# Patient Record
Sex: FEMALE | Race: WHITE | NOT HISPANIC OR LATINO | Employment: OTHER | URBAN - METROPOLITAN AREA
[De-identification: names, ages, dates, MRNs, and addresses within clinical notes are randomized per-mention and may not be internally consistent; named-entity substitution may affect disease eponyms.]

---

## 2024-07-29 ENCOUNTER — LAB (OUTPATIENT)
Dept: LAB | Facility: HOSPITAL | Age: 69
End: 2024-07-29
Payer: COMMERCIAL

## 2024-07-29 ENCOUNTER — HOSPITAL ENCOUNTER (OUTPATIENT)
Dept: GENERAL RADIOLOGY | Facility: HOSPITAL | Age: 69
Discharge: HOME | End: 2024-07-29
Payer: COMMERCIAL

## 2024-07-29 ENCOUNTER — OFFICE VISIT (OUTPATIENT)
Dept: INTERNAL MEDICINE | Facility: CLINIC | Age: 69
End: 2024-07-29
Payer: COMMERCIAL

## 2024-07-29 VITALS
WEIGHT: 240.8 LBS | DIASTOLIC BLOOD PRESSURE: 90 MMHG | BODY MASS INDEX: 42.66 KG/M2 | TEMPERATURE: 98 F | SYSTOLIC BLOOD PRESSURE: 130 MMHG | HEIGHT: 63 IN | HEART RATE: 70 BPM | OXYGEN SATURATION: 97 %

## 2024-07-29 DIAGNOSIS — Z13.29 SCREENING FOR ENDOCRINE DISORDER: ICD-10-CM

## 2024-07-29 DIAGNOSIS — E11.65 TYPE 2 DIABETES MELLITUS WITH HYPERGLYCEMIA, WITH LONG-TERM CURRENT USE OF INSULIN: ICD-10-CM

## 2024-07-29 DIAGNOSIS — Z13.21 ENCOUNTER FOR VITAMIN DEFICIENCY SCREENING: ICD-10-CM

## 2024-07-29 DIAGNOSIS — Z13.228 SCREENING FOR METABOLIC DISORDER: ICD-10-CM

## 2024-07-29 DIAGNOSIS — E11.65 TYPE 2 DIABETES MELLITUS WITH HYPERGLYCEMIA, WITH LONG-TERM CURRENT USE OF INSULIN: Primary | ICD-10-CM

## 2024-07-29 DIAGNOSIS — Z79.4 TYPE 2 DIABETES MELLITUS WITH HYPERGLYCEMIA, WITH LONG-TERM CURRENT USE OF INSULIN: Primary | ICD-10-CM

## 2024-07-29 DIAGNOSIS — Z13.0 SCREENING FOR DEFICIENCY ANEMIA: ICD-10-CM

## 2024-07-29 DIAGNOSIS — Z87.891 HISTORY OF TOBACCO USE: ICD-10-CM

## 2024-07-29 DIAGNOSIS — I10 ESSENTIAL HYPERTENSION: ICD-10-CM

## 2024-07-29 DIAGNOSIS — J45.20 MILD INTERMITTENT ASTHMA WITHOUT COMPLICATION: ICD-10-CM

## 2024-07-29 DIAGNOSIS — Z13.820 SCREENING FOR OSTEOPOROSIS: ICD-10-CM

## 2024-07-29 DIAGNOSIS — M65.331 TRIGGER MIDDLE FINGER OF RIGHT HAND: ICD-10-CM

## 2024-07-29 DIAGNOSIS — E78.2 MIXED HYPERLIPIDEMIA: ICD-10-CM

## 2024-07-29 DIAGNOSIS — I50.22 CHRONIC SYSTOLIC (CONGESTIVE) HEART FAILURE: ICD-10-CM

## 2024-07-29 DIAGNOSIS — Z79.4 TYPE 2 DIABETES MELLITUS WITH HYPERGLYCEMIA, WITH LONG-TERM CURRENT USE OF INSULIN: ICD-10-CM

## 2024-07-29 PROBLEM — J30.9 ALLERGIC RHINITIS: Status: RESOLVED | Noted: 2024-07-29 | Resolved: 2024-07-29

## 2024-07-29 PROBLEM — L08.9 INFECTION OF SKIN: Status: RESOLVED | Noted: 2024-07-29 | Resolved: 2024-07-29

## 2024-07-29 PROBLEM — B37.9 CANDIDA GLABRATA INFECTION: Status: RESOLVED | Noted: 2024-07-29 | Resolved: 2024-07-29

## 2024-07-29 PROBLEM — F95.8 BEHAVIORAL TIC: Status: ACTIVE | Noted: 2024-07-29

## 2024-07-29 PROBLEM — F95.8 BEHAVIORAL TIC: Status: RESOLVED | Noted: 2024-07-29 | Resolved: 2024-07-29

## 2024-07-29 PROBLEM — J45.909 AIRWAY HYPERREACTIVITY: Status: RESOLVED | Noted: 2024-07-29 | Resolved: 2024-07-29

## 2024-07-29 PROBLEM — I50.21 ACUTE SYSTOLIC HEART FAILURE: Status: ACTIVE | Noted: 2024-07-29

## 2024-07-29 PROBLEM — J45.909 AIRWAY HYPERREACTIVITY: Status: ACTIVE | Noted: 2024-07-29

## 2024-07-29 LAB
25(OH)D3 SERPL-MCNC: 25 NG/ML (ref 30–100)
ALBUMIN SERPL-MCNC: 4.4 G/DL (ref 3.5–5.2)
ALBUMIN/GLOB SERPL: 1.7 G/DL
ALP SERPL-CCNC: 58 U/L (ref 39–117)
ALT SERPL W P-5'-P-CCNC: 14 U/L (ref 1–33)
ANION GAP SERPL CALCULATED.3IONS-SCNC: 10.6 MMOL/L (ref 5–15)
AST SERPL-CCNC: 19 U/L (ref 1–32)
BASOPHILS # BLD AUTO: 0.04 10*3/MM3 (ref 0–0.2)
BASOPHILS NFR BLD AUTO: 0.7 % (ref 0–1.5)
BILIRUB SERPL-MCNC: 0.4 MG/DL (ref 0–1.2)
BUN SERPL-MCNC: 12 MG/DL (ref 8–23)
BUN/CREAT SERPL: 16.7 (ref 7–25)
CALCIUM SPEC-SCNC: 9.5 MG/DL (ref 8.6–10.5)
CHLORIDE SERPL-SCNC: 106 MMOL/L (ref 98–107)
CHOLEST SERPL-MCNC: 148 MG/DL (ref 0–200)
CO2 SERPL-SCNC: 24.4 MMOL/L (ref 22–29)
CREAT SERPL-MCNC: 0.72 MG/DL (ref 0.57–1)
DEPRECATED RDW RBC AUTO: 40.7 FL (ref 37–54)
EGFRCR SERPLBLD CKD-EPI 2021: 92.3 ML/MIN/1.73
EOSINOPHIL # BLD AUTO: 0.24 10*3/MM3 (ref 0–0.4)
EOSINOPHIL NFR BLD AUTO: 4.2 % (ref 0.3–6.2)
ERYTHROCYTE [DISTWIDTH] IN BLOOD BY AUTOMATED COUNT: 12.7 % (ref 12.3–15.4)
GLOBULIN UR ELPH-MCNC: 2.6 GM/DL
GLUCOSE SERPL-MCNC: 103 MG/DL (ref 65–99)
HBA1C MFR BLD: 6.4 % (ref 4.8–5.6)
HCT VFR BLD AUTO: 38.9 % (ref 34–46.6)
HDLC SERPL-MCNC: 50 MG/DL (ref 40–60)
HGB BLD-MCNC: 12.6 G/DL (ref 12–15.9)
IMM GRANULOCYTES # BLD AUTO: 0.02 10*3/MM3 (ref 0–0.05)
IMM GRANULOCYTES NFR BLD AUTO: 0.4 % (ref 0–0.5)
LDLC SERPL CALC-MCNC: 82 MG/DL (ref 0–100)
LDLC/HDLC SERPL: 1.64 {RATIO}
LYMPHOCYTES # BLD AUTO: 1.58 10*3/MM3 (ref 0.7–3.1)
LYMPHOCYTES NFR BLD AUTO: 27.9 % (ref 19.6–45.3)
MCH RBC QN AUTO: 28.4 PG (ref 26.6–33)
MCHC RBC AUTO-ENTMCNC: 32.4 G/DL (ref 31.5–35.7)
MCV RBC AUTO: 87.8 FL (ref 79–97)
MONOCYTES # BLD AUTO: 0.37 10*3/MM3 (ref 0.1–0.9)
MONOCYTES NFR BLD AUTO: 6.5 % (ref 5–12)
NEUTROPHILS NFR BLD AUTO: 3.41 10*3/MM3 (ref 1.7–7)
NEUTROPHILS NFR BLD AUTO: 60.3 % (ref 42.7–76)
NRBC BLD AUTO-RTO: 0 /100 WBC (ref 0–0.2)
PLATELET # BLD AUTO: 167 10*3/MM3 (ref 140–450)
PMV BLD AUTO: 12.2 FL (ref 6–12)
POTASSIUM SERPL-SCNC: 3.9 MMOL/L (ref 3.5–5.2)
PROT SERPL-MCNC: 7 G/DL (ref 6–8.5)
RBC # BLD AUTO: 4.43 10*6/MM3 (ref 3.77–5.28)
SODIUM SERPL-SCNC: 141 MMOL/L (ref 136–145)
TRIGL SERPL-MCNC: 81 MG/DL (ref 0–150)
TSH SERPL DL<=0.05 MIU/L-ACNC: 2.46 UIU/ML (ref 0.27–4.2)
VLDLC SERPL-MCNC: 16 MG/DL (ref 5–40)
WBC NRBC COR # BLD AUTO: 5.66 10*3/MM3 (ref 3.4–10.8)

## 2024-07-29 PROCEDURE — 73130 X-RAY EXAM OF HAND: CPT

## 2024-07-29 PROCEDURE — 82306 VITAMIN D 25 HYDROXY: CPT

## 2024-07-29 PROCEDURE — 83036 HEMOGLOBIN GLYCOSYLATED A1C: CPT

## 2024-07-29 PROCEDURE — 36415 COLL VENOUS BLD VENIPUNCTURE: CPT

## 2024-07-29 PROCEDURE — 82043 UR ALBUMIN QUANTITATIVE: CPT

## 2024-07-29 PROCEDURE — 99204 OFFICE O/P NEW MOD 45 MIN: CPT | Performed by: NURSE PRACTITIONER

## 2024-07-29 PROCEDURE — 80050 GENERAL HEALTH PANEL: CPT

## 2024-07-29 PROCEDURE — 80061 LIPID PANEL: CPT

## 2024-07-29 PROCEDURE — 82570 ASSAY OF URINE CREATININE: CPT

## 2024-07-29 RX ORDER — PRAVASTATIN SODIUM 20 MG
20 TABLET ORAL DAILY
COMMUNITY

## 2024-07-29 RX ORDER — TORSEMIDE 20 MG/1
20 TABLET ORAL DAILY
COMMUNITY

## 2024-07-29 RX ORDER — CARVEDILOL 12.5 MG/1
12.5 TABLET ORAL 2 TIMES DAILY WITH MEALS
COMMUNITY

## 2024-07-29 RX ORDER — LISINOPRIL 10 MG/1
10 TABLET ORAL DAILY
COMMUNITY

## 2024-07-29 RX ORDER — SEMAGLUTIDE 0.68 MG/ML
INJECTION, SOLUTION SUBCUTANEOUS WEEKLY
COMMUNITY
End: 2024-07-29 | Stop reason: SDUPTHER

## 2024-07-29 RX ORDER — SEMAGLUTIDE 0.68 MG/ML
0.5 INJECTION, SOLUTION SUBCUTANEOUS WEEKLY
Qty: 3 ML | Refills: 2 | Status: SHIPPED | OUTPATIENT
Start: 2024-07-29

## 2024-07-29 RX ORDER — ALBUTEROL SULFATE 90 MCG
2 HFA AEROSOL WITH ADAPTER (GRAM) INHALATION EVERY 4 HOURS PRN
COMMUNITY
Start: 2013-07-26

## 2024-07-29 NOTE — ASSESSMENT & PLAN NOTE
Hypertension is stable and controlled  Continue current treatment regimen.  Weight loss.  Regular aerobic exercise.  Blood pressure will be reassessed in 3 months.    Continue Lisinopril 10 mg daily

## 2024-07-29 NOTE — ASSESSMENT & PLAN NOTE
- Heart failure appears to be under control at this time. Please ensure to follow up with cardiologist for further evaluation of heart failure and ICD device.   Continue taking Torsemide 20 mg daily, carvedilol 12.5 mg BID. Monitors and stays within fluid restriction most days.

## 2024-07-29 NOTE — ASSESSMENT & PLAN NOTE
- We will repeat A1C today to determine if current treatment plan is effective.   - Ozempic refill has been sent to the pharmacy.   - Continue to exercise by swimming.   - Work on improving diet.   - Does Diabetic eye exam annually.

## 2024-07-29 NOTE — PROGRESS NOTES
New Patient Office Visit      Date: 2024   Patient Name: Vicky Ortiz  : 1955   MRN: [id number]     Chief Complaint:    Chief Complaint   Patient presents with    Med Refill           Subjective   History of Present Illness: Vicky Ortiz is a 68 y.o. female who is here today to establish care.  Mrs. Ortiz brought most recent progress notes from her prior PCP in [place] dated 2024.  I have reviewed those records.    Vicky moved back to [place], [place] from [place] 2024. She lives in an apartment with her  and his therapy dog. Her  has a degenerative neuro disorder and she acts as his primary caregiver. She does not use alcohol or illicit drugs, and she quit smoking 10 years ago. The apartment complex she moved into has a pool and she has been swimming for exercise. Vicky admits that her diet has not been as healthy as it should be. Since moving she has had difficulty finding a routine to allow for healthier eating.     Diabetes mellitus type 2:  She was doing well on medication regimen of metformin 1000 mg twice daily, Ozempic 0.5 mg weekly subcu.  She was tolerating well without any side effects.  Her A1c 2023 was 6.3.  Blood glucose level at home range from 1 20-1 40; she denies any hypoglycemic episodes.  She has previously tried Jardiance with the side effect of hives and Amaryl which was discontinued due to improvement in A1c.  She has been out of her Ozempic for 2 months and is requesting a refill today. She reports taking her metformin as prescribed. Does not check her blood glucose levels regularly.       Vicky had a pacemaker/defibrillator device placed in 2019. She needs a referral to cardiologist in [place]. In the past she saw Dr. Ruperto Calles and would like to see him again if possible.  While in [place] she was following with Dr. Rai, cardiology.  She denies any complaints regarding her device or heart failure. Takes Toresemide  20 mg daily and carvedilol 12.5 mg BID. She does have edema in bilat legs most days. Tries to watch her fluid intake.    Echocardiogram: Ejection fraction 45%, grade 1 LV diastolic function abnormal, trace AR/MR, mild TR, RVSP 25-30  August 2023 BNP within normal limits    Moderate persistent asthma: Patient takes Trelegy 1 puff daily and albuterol inhaler as needed.  S TEMI frequency use: Almost once a day which is an improvement from reduced twice a day; rarely uses DuoNeb nebulizer as needed  Feels symptoms are controlled on current regiment.  She feels seasonal allergy symptoms because asthma symptoms act up intermittently.    Thyroid nodule: Ultrasound thyroid completed on August 22, 2023: Bilateral thyroid nodularity none meet TI-RADS criteria for FNA nodules were of low suspicion    1 nodules: CT lung cancer screening September 2022: Lung-RADS 4A, recommended repeat in 3 months.  Ms. Yee was evaluated with pulmonology on September 14, 2022 which recommended repeat CT scan in 3 months.    CT of lung cancer screening September 2023: Stability of lung nodules, lung-RADS category 2-benign.    B/P is controlled with lisinopril, carvedidlol and toresemide.  Denies headaches, visual disturbances.     About 6 months she started to develop trigger finger in her right 3rd digit. This is happening more often. She would like a referral to hand surgery.     She is requesting a handicap placard form to take to the DVM as her current placard is from the [facility].    Takes cholesterol medication as prescribed.  Tries to stay away from fried foods.         Review Of Systems:Review of Systems   Constitutional: Negative.    Respiratory:  Negative for chest tightness and shortness of breath.    Cardiovascular:  Negative for chest pain and leg swelling.   Gastrointestinal: Negative.    Musculoskeletal:         Right 3rd digit trigger finger   Neurological: Negative.  Negative for weakness and headaches.   All other  systems reviewed and are negative.       Past Medical History:   Past Medical History:   Diagnosis Date    Allergic 1978    Arthritis 2015    Asthma 1979    Candida glabrata infection 07/29/2024    CHF (congestive heart failure) 2013    COPD (chronic obstructive pulmonary disease) 2014    Coronary artery disease 2013    Diabetes mellitus 2015    Diabetic retinopathy     Hyperlipidemia 1997    Hypertension 1997    Neuromuscular disorder 2004    Obesity 1978    Osteoarthritis 09/15/2016    Right knee Per EPIC      Tachycardia 09/15/2016    Per EPIC         Past Surgical History:   Past Surgical History:   Procedure Laterality Date    CARDIAC CATHETERIZATION  2018    CARDIAC SURGERY  2019    COLONOSCOPY  2013    FRACTURE SURGERY  2018    PILONIDAL CYSTECTOMY      TONSILLECTOMY  1961       Family History:   Family History   Problem Relation Age of Onset    Cancer Mother         Breast    Miscarriages / Stillbirths Mother     Hypertension Mother     Hyperlipidemia Father     Other Father         Dementia    Hypertension Father     Alcohol abuse Brother     Hypertension Brother     Angina Brother     Hyperlipidemia Brother     Hypertension Brother     Diabetes Maternal Grandmother     Stroke Paternal Grandmother     Early death Paternal Grandfather         early 50s    Asthma Maternal Aunt     COPD Maternal Aunt     Alcohol abuse Maternal Uncle     Alcohol abuse Maternal Uncle     Early death Paternal Uncle         early 50s       Social History:   Social History     Socioeconomic History    Marital status:    Tobacco Use    Smoking status: Former     Current packs/day: 0.00     Average packs/day: 0.5 packs/day for 37.2 years (18.6 ttl pk-yrs)     Types: Cigarettes     Start date: 7/15/1976     Quit date: 10/4/2013     Years since quitting: 10.8    Smokeless tobacco: Never   Vaping Use    Vaping status: Never Used   Substance and Sexual Activity    Alcohol use: Never    Drug use: Never    Sexual activity: Not  Currently     Partners: Male     Birth control/protection: None     Comment: Infertile       Health Maintenance:   Colorectal Screening:   10 years ago; is due but would like to wait since she is primary caregiver of disabled .   Bone Density/DEXA: ; will order today   Hep C ( [id number]):  done a few years ago  Mammogram: (women over 40): Oct 2023   Diabetic Eye exam: completed May 2024; starting to get cataracts.     Medications:     Current Outpatient Medications:     ASPIRIN 81 PO, Take 1 tablet by mouth Daily., Disp: , Rfl:     carvedilol (COREG) 12.5 MG tablet, Take 1 tablet by mouth 2 (Two) Times a Day With Meals., Disp: , Rfl:     Fluticasone-Umeclidin-Vilant (TRELEGY ELLIPTA) 200-62.5-25 MCG/ACT inhaler, Inhale 1 puff Daily., Disp: , Rfl:     lisinopril (PRINIVIL,ZESTRIL) 10 MG tablet, Take 1 tablet by mouth Daily., Disp: , Rfl:     Ozempic, 0.25 or 0.5 MG/DOSE, 2 MG/3ML solution pen-injector, Inject 0.5 mg under the skin into the appropriate area as directed 1 (One) Time Per Week., Disp: 3 mL, Rfl: 2    pravastatin (PRAVACHOL) 20 MG tablet, Take 1 tablet by mouth Daily., Disp: , Rfl:     Proventil  (90 Base) MCG/ACT inhaler, Inhale 2 puffs Every 4 (Four) Hours As Needed for Wheezing or Shortness of Air., Disp: , Rfl:     torsemide (DEMADEX) 20 MG tablet, Take 1 tablet by mouth Daily., Disp: , Rfl:     metFORMIN (GLUCOPHAGE) 1000 MG tablet, Take 1 tablet by mouth 2 (Two) Times a Day With Meals., Disp: 60 tablet, Rfl: 5    Allergies:   Allergies   Allergen Reactions    Atorvastatin Nausea And Vomiting and Unknown - Low Severity    Jardiance [Empagliflozin] Rash    Shellfish-Derived Products GI Bleeding and Rash       Objective         Physical Exam:  Vital Signs:   Vitals:    24 1304   BP: 130/90   BP Location: Left arm   Patient Position: Sitting   Cuff Size: Adult   Pulse: 70   Temp: 98 °F (36.7 °C)   TempSrc: Infrared   SpO2: 97%   Weight: 109 kg (240 lb 12.8 oz)   Height: 159.5  "cm (62.8\")   PainSc:   2   PainLoc: Hand  Comment: Right     Body mass index is 42.93 kg/m².  Class 3 Severe Obesity (BMI >=40). Obesity-related health conditions include the following: hypertension, coronary heart disease, diabetes mellitus, and dyslipidemias. Obesity is unchanged. BMI is is above average; BMI management plan is completed. We discussed low calorie, low carb based diet program, portion control, and increasing exercise.       Physical Exam  Vitals and nursing note reviewed.   Constitutional:       General: She is not in acute distress.     Appearance: Normal appearance. She is not ill-appearing.   HENT:      Head: Normocephalic.      Mouth/Throat:      Mouth: Mucous membranes are moist.   Eyes:      Conjunctiva/sclera: Conjunctivae normal.   Neck:      Thyroid: No thyromegaly.   Cardiovascular:      Rate and Rhythm: Normal rate and regular rhythm.      Pulses: Normal pulses.      Heart sounds: Normal heart sounds, S1 normal and S2 normal.   Pulmonary:      Effort: Pulmonary effort is normal.      Breath sounds: Normal breath sounds and air entry.   Abdominal:      General: Bowel sounds are normal.      Palpations: Abdomen is soft.      Tenderness: There is no abdominal tenderness.   Musculoskeletal:      Cervical back: Full passive range of motion without pain, normal range of motion and neck supple.      Right lower leg: No edema.      Left lower leg: No edema.   Lymphadenopathy:      Cervical: No cervical adenopathy.   Skin:     General: Skin is warm and dry.      Capillary Refill: Capillary refill takes less than 2 seconds.   Neurological:      General: No focal deficit present.      Mental Status: She is alert.      Coordination: Coordination is intact.      Gait: Gait is intact.           Results:   PHQ-9 Total Score: 0        Assessment / Plan      Assessment/Plan:   Diagnoses and all orders for this visit:    1. Type 2 diabetes mellitus with hyperglycemia, with long-term current use of insulin " (Primary)  Overview:  Metformin 1000 mg twice daily, Ozempic 0.5 mg weekly subcu, pravastatin 20 mg daily    Assessment & Plan:  - We will repeat A1C today to determine if current treatment plan is effective.   - Ozempic refill has been sent to the pharmacy.   - Continue to exercise by swimming.   - Work on improving diet.   - Does Diabetic eye exam annually.      Orders:  -     ORDER: Hemoglobin A1c; Future  -     Microalbumin / Creatinine Urine Ratio - Urine, Clean Catch; Future  -     metFORMIN (GLUCOPHAGE) 1000 MG tablet; Take 1 tablet by mouth 2 (Two) Times a Day With Meals.  Dispense: 60 tablet; Refill: 5    2. Screening for metabolic disorder  -     Comprehensive Metabolic Panel; Future    3. Screening for deficiency anemia  -     CBC & Differential; Future    4. Encounter for vitamin deficiency screening  -     Vitamin D,25-Hydroxy; Future    5. Screening for endocrine disorder  -     TSH; Future    6. Chronic systolic (congestive) heart failure  Assessment & Plan:  - Heart failure appears to be under control at this time. Please ensure to follow up with cardiologist for further evaluation of heart failure and ICD device.   Continue taking Torsemide 20 mg daily, carvedilol 12.5 mg BID. Monitors and stays within fluid restriction most days.     Orders:  -     Ambulatory Referral to Cardiology    7. Trigger middle finger of right hand  Assessment & Plan:  X-ray of right hand ordered;   Hand surgeon referral placed.     Orders:  -     XR Hand 3+ View Right; Future  -     Ambulatory Referral to Hand Surgery    8. Mixed hyperlipidemia  Assessment & Plan:  - Lipid panel   - Continue pravastatin as prescribed.     Orders:  -     Lipid Panel; Future    9. Essential hypertension  Assessment & Plan:  Hypertension is stable and controlled  Continue current treatment regimen.  Weight loss.  Regular aerobic exercise.  Blood pressure will be reassessed in 3 months.    Continue Lisinopril 10 mg daily       10. Mild  intermittent asthma without complication  Assessment & Plan:  Uses inhalers as directed.             11. Screening for osteoporosis  Assessment & Plan:  DEXA scan ordered.     Orders:  -     DEXA Bone Density Axial; Future    12. History of tobacco use  Assessment & Plan:  Quit smoking 10 years ago.  History of 37-year pack smoker  CT lung cancer screening September 2022: Lung RADS 4A, recommended to repeat in 3 months-  Patient was evaluated with pulmonology September 14, 2022 with a recommended repeat CT scan in 3 months.  September 2023: Stability of lung nodules, lung RADS category 2-benign.      Other orders  -     Ozempic, 0.25 or 0.5 MG/DOSE, 2 MG/3ML solution pen-injector; Inject 0.5 mg under the skin into the appropriate area as directed 1 (One) Time Per Week.  Dispense: 3 mL; Refill: 2         Follow Up:   Return in about 3 months (around 10/29/2024) for 3 month , Medicare Wellness.    MARK Storey   [facility] [address]

## 2024-07-30 PROBLEM — Z87.891 HISTORY OF TOBACCO USE: Status: ACTIVE | Noted: 2024-07-30

## 2024-07-30 PROBLEM — E04.1 THYROID NODULE: Status: ACTIVE | Noted: 2024-07-30

## 2024-07-30 PROBLEM — J45.40 MODERATE PERSISTENT ASTHMA: Status: RESOLVED | Noted: 2024-07-30 | Resolved: 2024-07-30

## 2024-07-30 PROBLEM — E04.1 THYROID NODULE: Status: RESOLVED | Noted: 2024-07-30 | Resolved: 2024-07-30

## 2024-07-30 PROBLEM — J45.40 MODERATE PERSISTENT ASTHMA: Status: ACTIVE | Noted: 2024-07-30

## 2024-07-30 LAB
ALBUMIN UR-MCNC: <1.2 MG/DL
CREAT UR-MCNC: 69.3 MG/DL
MICROALBUMIN/CREAT UR: NORMAL MG/G{CREAT}

## 2024-07-30 NOTE — ASSESSMENT & PLAN NOTE
Quit smoking 10 years ago.  History of 37-year pack smoker  CT lung cancer screening September 2022: Lung RADS 4A, recommended to repeat in 3 months-  Patient was evaluated with pulmonology September 14, 2022 with a recommended repeat CT scan in 3 months.  September 2023: Stability of lung nodules, lung RADS category 2-benign.

## 2024-08-02 NOTE — PROGRESS NOTES
"                                                                 [facility]     Office Visit       Date: 08/05/2024   Patient Name: Vicky Ortiz  MRN: [id number]  YOB: 1955    Referring Physician: Alexia Varela APRN     Chief Complaint:   Chief Complaint   Patient presents with    Right Hand - Pain     History of Present Illness:   Vicky Ortiz is a 68 y.o. female right-hand-dominant presented clinic with complaints of right long finger catching locking.  She reports symptoms been present for approximately 6 months.  This is worse in the morning and requires manual straightening.  She has had a previous right distal radius fracture performed in [place] in 2018.  She also states that she has had numbness and tingling to the right hand for several years.  No other complaints or concerns.    PMH: Hyperlipidemia, hypertension, type 2 diabetes.  Last hemoglobin A1c was 6.4.    Subjective   Review of Systems:   Review of Systems   Pertinent review of systems per HPI    I reviewed the patient's chief complaint, history of present illness, review of systems, past medical history, surgical history, family history, social history, medications and allergy list in the EMR on 08/05/2024 and agree with the findings above.    Objective    Vital Signs:   Vitals:    08/05/24 0807   BP: 124/86   Weight: 109 kg (240 lb 4.8 oz)   Height: 159.5 cm (62.8\")     BMI:      General: No acute distress. Alert and oriented.   Cardiovascular: Palpable radial pulse.   Respiratory: Breathing is nonlabored.     Ortho Exam:  Examination of the right upper extremity demonstrates a well-healed surgical incision overlying the volar aspect of the distal radius.  Slight atrophy is noted at the thenar eminence.  She is tender at the long finger A1 pulley.  Catching or locking of the long finger is elicited during examination.  Positive Tinel, Durkan's, Phalen's at the wrist.  5/5 APB and FDI strength.  Decreased " sensation throughout the median nerve distribution of the hand.  Warm and well-perfused distally.    Imaging / Studies:    Imaging Results (Last 24 Hours)       ** No results found for the last 24 hours. **        Right hand x-rays obtained on 7/29/2024 were personally reviewed and interpreted by myself.  The patient is status post distal radius volar plate and screw fixation.  Arthritic changes noted throughout the PIP and DIP joints.  No acute findings.     Procedure Note:  After reviewing the risks, benefits and alternatives to a steroid injection, which include but are not limited to; hypopigmentation, fat necrosis/atrophy, pain, swelling, bleeding, bruising, damage to nearby nerves/vessels, allergic reaction , transient elevation in blood glucose levels and infection a verbal consent was obtained. A time-out was then performed and the affected hand was prepped with chlorhexadine soap and ethyl chloride was used to numb the skin. The right long finger flexor tendon sheath was injected with 0.5cc: 0.5cc mixture of Kenalog - 40 mg/ml and Lidocaine - 1% / 2 ml. The injection was well tolerated and a sterile dressing was applied. There were no complications. I advised the patient that they might experience some local discomfort for the next couple days and can apply ice to the site as needed.    Assessment / Plan    Assessment/Plan:   Vicky Ortiz is a 68 y.o. female with right long trigger finger.    I discussed with the patient their clinical findings demonstrate a trigger finger.  We had a lengthy discussion regarding the pathophysiology of inflammation of the tendon-sheath unit, using the analogy of a subway tunnel.  Both conservative and surgical options were discussed.  Conservative treatments in the form of: observation, gentle stretching exercises, nighttime coban wrapping, and injection were presented. Discussed that approximately 70% of patients have complete symptoms resolution after 1 steroid  injection, and should they fail 1 injection, they may still be considered for a second steroid injection.  Also discussed that the patient may not see any improvement from the steroid injection for sometimes 2 weeks. Operative treatments in the form of A1 pulley release was also discussed.  After expressing understanding of all options, the patient elects to proceed with corticosteroid injection, nighttime Coban wrapping, and gentle stretching.    Additionally, I discussed with the patient their clinical findings demonstrate carpal tunnel syndrome.  The pathophysiology of the condition, including compression of the median nerve in the carpal tunnel, was explained in detail.  It was also discussed that with more severe symptomatology, such as persistent and worsening paresthesias, that permanent nerve damage may result, which may make improvement after surgery less predictable.  Both conservative and surgical options were discussed.  Conservative treatments in the form of: observation, gentle nerve gliding exercises, night time splinting, and injection were presented.  Operative treatment in the form of open carpal tunnel release was presented and reiterated that the goal of surgery is to prevent further compression and damage to the nerve.  Further workup with electrodiagnostic studies was also discussed. After expressing understanding of all options, the patient elects to proceed with nighttime bracing, gentle nerve glides, and obtaining electrodiagnostic studies.  I will see her back with EMG results.  They were agreeable with the plan.  All questions and concerns were addressed.        ICD-10-CM ICD-9-CM   1. Trigger middle finger of right hand  M65.331 727.03   2. Carpal tunnel syndrome of right wrist  G56.01 354.0     Follow Up:   Return for Follow Up- After Testing.      Vivian Kirkland MD  [facility]

## 2024-08-05 ENCOUNTER — OFFICE VISIT (OUTPATIENT)
Dept: ORTHOPEDIC SURGERY | Facility: CLINIC | Age: 69
End: 2024-08-05
Payer: COMMERCIAL

## 2024-08-05 VITALS
BODY MASS INDEX: 42.58 KG/M2 | WEIGHT: 240.3 LBS | SYSTOLIC BLOOD PRESSURE: 124 MMHG | HEIGHT: 63 IN | DIASTOLIC BLOOD PRESSURE: 86 MMHG

## 2024-08-05 DIAGNOSIS — G56.01 CARPAL TUNNEL SYNDROME OF RIGHT WRIST: ICD-10-CM

## 2024-08-05 DIAGNOSIS — M65.331 TRIGGER MIDDLE FINGER OF RIGHT HAND: Primary | ICD-10-CM

## 2024-08-05 PROCEDURE — 99204 OFFICE O/P NEW MOD 45 MIN: CPT | Performed by: STUDENT IN AN ORGANIZED HEALTH CARE EDUCATION/TRAINING PROGRAM

## 2024-08-05 PROCEDURE — 20550 NJX 1 TENDON SHEATH/LIGAMENT: CPT | Performed by: STUDENT IN AN ORGANIZED HEALTH CARE EDUCATION/TRAINING PROGRAM

## 2024-08-05 RX ORDER — TRIAMCINOLONE ACETONIDE 40 MG/ML
20 INJECTION, SUSPENSION INTRA-ARTICULAR; INTRAMUSCULAR
Status: COMPLETED | OUTPATIENT
Start: 2024-08-05 | End: 2024-08-05

## 2024-08-05 RX ORDER — LIDOCAINE HYDROCHLORIDE 10 MG/ML
0.5 INJECTION, SOLUTION INFILTRATION; PERINEURAL
Status: COMPLETED | OUTPATIENT
Start: 2024-08-05 | End: 2024-08-05

## 2024-08-05 RX ADMIN — TRIAMCINOLONE ACETONIDE 20 MG: 40 INJECTION, SUSPENSION INTRA-ARTICULAR; INTRAMUSCULAR at 08:24

## 2024-08-05 RX ADMIN — LIDOCAINE HYDROCHLORIDE 0.5 ML: 10 INJECTION, SOLUTION INFILTRATION; PERINEURAL at 08:24

## 2024-08-05 NOTE — PROGRESS NOTES
Procedure   - Hand/Upper Extremity Injection: R long A1 for trigger finger on 8/5/2024 8:24 AM  Indications: pain  Details: 27 G needle, volar approach  Medications: 0.5 mL lidocaine 1 %; 20 mg triamcinolone acetonide 40 MG/ML  Outcome: tolerated well, no immediate complications  Procedure, treatment alternatives, risks and benefits explained, specific risks discussed. Consent was given by the patient. Immediately prior to procedure a time out was called to verify the correct patient, procedure, equipment, support staff and site/side marked as required. Patient was prepped and draped in the usual sterile fashion.

## 2024-09-06 ENCOUNTER — TELEPHONE (OUTPATIENT)
Dept: INTERNAL MEDICINE | Facility: CLINIC | Age: 69
End: 2024-09-06

## 2024-09-06 NOTE — TELEPHONE ENCOUNTER
Caller: Vicky Ortiz    Relationship: Self    Best call back number: [phone]     Who is your current provider: JIMI HUNT     Is your current provider offboarding? NO    Who would you like your new provider to be: NANO JOYNER     What are your reasons for transferring care: PATIENT IS WANTING ALL OF HER FAMILY TO BE SEEN BY THE SAME PROVIDER     Additional notes:

## 2024-09-09 ENCOUNTER — OFFICE VISIT (OUTPATIENT)
Dept: CARDIOLOGY | Facility: CLINIC | Age: 69
End: 2024-09-09
Payer: COMMERCIAL

## 2024-09-09 VITALS
DIASTOLIC BLOOD PRESSURE: 76 MMHG | OXYGEN SATURATION: 98 % | HEART RATE: 66 BPM | SYSTOLIC BLOOD PRESSURE: 118 MMHG | WEIGHT: 241.6 LBS | BODY MASS INDEX: 42.81 KG/M2 | HEIGHT: 63 IN

## 2024-09-09 DIAGNOSIS — I10 ESSENTIAL HYPERTENSION: ICD-10-CM

## 2024-09-09 DIAGNOSIS — E78.2 MIXED HYPERLIPIDEMIA: ICD-10-CM

## 2024-09-09 DIAGNOSIS — I50.22 CHRONIC SYSTOLIC (CONGESTIVE) HEART FAILURE: Primary | ICD-10-CM

## 2024-09-09 PROCEDURE — 93000 ELECTROCARDIOGRAM COMPLETE: CPT | Performed by: INTERNAL MEDICINE

## 2024-09-09 PROCEDURE — 99204 OFFICE O/P NEW MOD 45 MIN: CPT | Performed by: INTERNAL MEDICINE

## 2024-09-09 RX ORDER — CARVEDILOL 12.5 MG/1
12.5 TABLET ORAL 2 TIMES DAILY WITH MEALS
Qty: 180 TABLET | Refills: 3 | Status: SHIPPED | OUTPATIENT
Start: 2024-09-09

## 2024-09-09 NOTE — PROGRESS NOTES
[facility] Cardiology  [address], [address]  [place], [place], [address]    [phone]  WWW.[facility]Aba[facility]od[facility] LLCFitz[facility]           OUTPATIENT CLINIC CONSULTATION NOTE    Patient care team:  Patient Care Team:  Alexia Varela APRN as PCP - General (Nurse Practitioner)  Jesus Gonzalez MD as Consulting Physician (Cardiology)    Requesting Provider and Reason for consultation: The patient is being seen today at the request of MARK Boudreaux for chronic systolic heart failure, cardiomyopathy.     Subjective:   Chief complaint:   Chief Complaint   Patient presents with    Establish Care    Cardiomyopathy         Vicky Ortiz is a 68 y.o. female.  Cardiac focused problem list:  Cardiomyopathy  Echocardiogram 8/13/2014:  LVEF 25-30%. Mid anteroseptal, mid anterior, mid inferoseptal, apical septal and apical anterior wall segments are akinetic. Mild to moderate MR.   [facility], 9/08/2014:   Mild, nonobstructive CAD.   Echocardiogram 12/04/2014:  LVEF 40%. Normal valves.   Echocardiogram 9/2016:  LVEF 45-50%.   Echocardiogram 2/15/2018:  LVEF 25%. Mild MR. Mild TR. Diastolic dysfunction. RVSP 25-30 mmHg.   C 2/15/2018:  Normal coronaries. Distal LAD bridging.   Echocardiogram 5/2018:  LVEF 40%.   Status post [employer] Bi-V ICD, 6/10/2019, in [place].   OS Echocardiogram 3/09/2021:  LVEF 45-50%.   OSH Echocardiogram 4/15/2024:  LVEF 60%. Mild MR. Mild TR.RVSP 20-25 mmHg.   Left bundle branch block.   Diagnosed by Lyndon Catalan, approximately  2007.  Stress test (12/14/2005):  Negative for ischemia, mildly hypokinetic LVEF.   Cardiac catheterization at [facility] in [place], data deficit. No intervention.   Palpitations.   Hypertension.    Type 2 diabetes mellitus.   Hyperlipidemia.   Reactive airway disease.   Carpal tunnel.   Tobacco abuse.   Surgical history.    Pilonidal cyst removal.   Tonsillectomy.   Throat surgery    HPI:    Patient presents today in consultation for  cardiomyopathy.  Previously followed with [facility] in [place], [place].     Down 40 pounds over the last year, on Ozempic.  Active without significant cardiopulmonary symptoms.  Occasionally needs extra torsemide      Review of Systems:  As noted above in the HPI    PFSH:  Patient Active Problem List   Diagnosis    Diabetes mellitus    Hyperlipidemia    Chronic systolic (congestive) heart failure    Essential hypertension    Trigger middle finger of right hand    Screening for osteoporosis    Mild intermittent asthma without complication    History of tobacco use    Thyroid nodule    Carpal tunnel syndrome of right wrist         Current Outpatient Medications:     ASPIRIN 81 PO, Take 1 tablet by mouth Daily., Disp: , Rfl:     carvedilol (COREG) 12.5 MG tablet, Take 1 tablet by mouth 2 (Two) Times a Day With Meals., Disp: , Rfl:     cholecalciferol (VITAMIN D3) 250 MCG (25551 UT) capsule, Take 1 capsule by mouth Daily., Disp: , Rfl:     Fluticasone-Umeclidin-Vilant (TRELEGY ELLIPTA) 200-62.5-25 MCG/ACT inhaler, Inhale 1 puff Daily., Disp: , Rfl:     lisinopril (PRINIVIL,ZESTRIL) 10 MG tablet, Take 1 tablet by mouth Daily., Disp: , Rfl:     metFORMIN (GLUCOPHAGE) 1000 MG tablet, Take 1 tablet by mouth 2 (Two) Times a Day With Meals., Disp: 60 tablet, Rfl: 5    Ozempic, 0.25 or 0.5 MG/DOSE, 2 MG/3ML solution pen-injector, Inject 0.5 mg under the skin into the appropriate area as directed 1 (One) Time Per Week., Disp: 3 mL, Rfl: 2    pravastatin (PRAVACHOL) 40 MG tablet, Take 1 tablet by mouth Daily., Disp: , Rfl:     Proventil  (90 Base) MCG/ACT inhaler, Inhale 2 puffs Every 4 (Four) Hours As Needed for Wheezing or Shortness of Air., Disp: , Rfl:     torsemide (DEMADEX) 20 MG tablet, Take 1 tablet by mouth Daily., Disp: , Rfl:     Allergies   Allergen Reactions    Atorvastatin Nausea And Vomiting and Unknown - Low Severity    Jardiance [Empagliflozin] Rash    Shellfish-Derived Products GI Bleeding and Rash  "      Social History     Socioeconomic History    Marital status:    Tobacco Use    Smoking status: Former     Current packs/day: 0.00     Average packs/day: 0.5 packs/day for 37.2 years (18.6 ttl pk-yrs)     Types: Cigarettes     Start date: 7/15/1976     Quit date: 10/4/2013     Years since quitting: 10.9     Passive exposure: Never    Smokeless tobacco: Never   Vaping Use    Vaping status: Never Used   Substance and Sexual Activity    Alcohol use: Never    Drug use: Never    Sexual activity: Not Currently     Partners: Male     Birth control/protection: None     Comment: Infertile     Family History   Problem Relation Age of Onset    Cancer Mother         Breast    Miscarriages / Stillbirths Mother     Hypertension Mother     Hyperlipidemia Father     Hypertension Father     Alcohol abuse Brother     Hypertension Brother     Angina Brother     Hyperlipidemia Brother     Hypertension Brother     Asthma Maternal Aunt     COPD Maternal Aunt     Alcohol abuse Maternal Uncle     Alcohol abuse Maternal Uncle     Early death Paternal Uncle         early 50s    Diabetes Maternal Grandmother     Stroke Paternal Grandmother     Early death Paternal Grandfather         early 50s         Objective:   Physical Exam:  /76 (BP Location: Right arm, Patient Position: Sitting, Cuff Size: Adult)   Pulse 66   Ht 160 cm (63\")   Wt 110 kg (241 lb 9.6 oz)   SpO2 98%   BMI 42.80 kg/m²    CONSTITUTIONAL: No acute distress  RESPIRATORY: Normal effort. Clear to auscultation bilaterally without wheezing or rales  CARDIOVASCULAR: Regular rate and rhythm with normal S1 and S2. Without murmur.  PERIPHERAL VASCULAR: No carotid bruit bilaterally.  Normal radial pulse. There is no significant lower extremity edema bilaterally.      Labs:  Labs reviewed by myself    Lab Results   Component Value Date    CHOL 148 07/29/2024     Lab Results   Component Value Date    TRIG 81 07/29/2024     Lab Results   Component Value Date    HDL " "50 07/29/2024     Lab Results   Component Value Date    LDL 82 07/29/2024     No components found for: \"LDLDIRECTC\"    Diagnostic Data:      ECG 12 Lead    Date/Time: 9/9/2024 2:01 PM  Performed by: Jesus Gonzalez MD    Authorized by: Jesus Gonzalez MD  Comparison: compared with previous ECG from 5/26/2015  Comparison to previous ECG: Now paced  Rhythm: paced          DEVICE INTERROGATION:  MDT, Interrogation date 9/9/24- RA pacing 0%, BiV pacing 99%. Threshold and impedances are acceptable. Battery voltage is 1.9 years.      Assessment and Plan:     Chronic systolic heart failure  Nonischemic cardiomyopathy  Essential hypertension  Mixed hyperlipidemia   Obesity  -Continue current cardiac medications  -Continue daily weights.  Extra torsemide if needed  -Continue lifestyle/risk factor modification  -Could not afford co-pays on home/remote monitoring.  Will do in office monitoring at least until she is closer to [place]    - Return in about 6 months (around 3/9/2025) for Next follow up with MARK Farias; [employer]tr[employer] device interrogation.      "

## 2024-09-24 ENCOUNTER — HOSPITAL ENCOUNTER (OUTPATIENT)
Dept: CT IMAGING | Facility: HOSPITAL | Age: 69
Discharge: HOME | End: 2024-09-24
Admitting: NURSE PRACTITIONER
Payer: COMMERCIAL

## 2024-09-24 DIAGNOSIS — Z87.891 HISTORY OF TOBACCO USE: ICD-10-CM

## 2024-09-24 PROCEDURE — 71271 CT THORAX LUNG CANCER SCR C-: CPT

## 2024-10-01 DIAGNOSIS — R91.1 PULMONARY NODULE: Primary | ICD-10-CM

## 2024-10-08 ENCOUNTER — OFFICE VISIT (OUTPATIENT)
Dept: PULMONOLOGY | Facility: CLINIC | Age: 69
End: 2024-10-08
Payer: COMMERCIAL

## 2024-10-08 ENCOUNTER — OFFICE VISIT (OUTPATIENT)
Dept: INTERNAL MEDICINE | Facility: CLINIC | Age: 69
End: 2024-10-08
Payer: COMMERCIAL

## 2024-10-08 VITALS
TEMPERATURE: 97 F | DIASTOLIC BLOOD PRESSURE: 78 MMHG | OXYGEN SATURATION: 98 % | HEART RATE: 72 BPM | BODY MASS INDEX: 42.17 KG/M2 | SYSTOLIC BLOOD PRESSURE: 122 MMHG | RESPIRATION RATE: 16 BRPM | HEIGHT: 63 IN | WEIGHT: 238 LBS

## 2024-10-08 VITALS
HEART RATE: 66 BPM | DIASTOLIC BLOOD PRESSURE: 70 MMHG | HEIGHT: 63 IN | OXYGEN SATURATION: 95 % | SYSTOLIC BLOOD PRESSURE: 112 MMHG | WEIGHT: 237.4 LBS | TEMPERATURE: 97.5 F | BODY MASS INDEX: 42.06 KG/M2

## 2024-10-08 DIAGNOSIS — J45.20 MILD INTERMITTENT ASTHMA WITHOUT COMPLICATION: ICD-10-CM

## 2024-10-08 DIAGNOSIS — Z00.00 ROUTINE MEDICAL EXAM: Primary | ICD-10-CM

## 2024-10-08 DIAGNOSIS — I10 ESSENTIAL HYPERTENSION: ICD-10-CM

## 2024-10-08 DIAGNOSIS — E78.2 MIXED HYPERLIPIDEMIA: ICD-10-CM

## 2024-10-08 DIAGNOSIS — J41.1 MUCOPURULENT CHRONIC BRONCHITIS: ICD-10-CM

## 2024-10-08 DIAGNOSIS — Z78.0 POST-MENOPAUSAL: ICD-10-CM

## 2024-10-08 DIAGNOSIS — Z87.891 PERSONAL HISTORY OF SMOKING: ICD-10-CM

## 2024-10-08 DIAGNOSIS — R91.1 LUNG NODULE: ICD-10-CM

## 2024-10-08 DIAGNOSIS — I50.22 CHRONIC SYSTOLIC (CONGESTIVE) HEART FAILURE: ICD-10-CM

## 2024-10-08 DIAGNOSIS — E11.9 TYPE 2 DIABETES MELLITUS WITHOUT COMPLICATION, WITHOUT LONG-TERM CURRENT USE OF INSULIN: ICD-10-CM

## 2024-10-08 DIAGNOSIS — Z12.11 COLON CANCER SCREENING: ICD-10-CM

## 2024-10-08 DIAGNOSIS — E04.2 MULTIPLE THYROID NODULES: ICD-10-CM

## 2024-10-08 DIAGNOSIS — R91.1 LUNG NODULE: Primary | ICD-10-CM

## 2024-10-08 DIAGNOSIS — Z12.31 ENCOUNTER FOR SCREENING MAMMOGRAM FOR MALIGNANT NEOPLASM OF BREAST: ICD-10-CM

## 2024-10-08 DIAGNOSIS — L30.9 DERMATITIS: ICD-10-CM

## 2024-10-08 PROCEDURE — 99396 PREV VISIT EST AGE 40-64: CPT | Performed by: PHYSICIAN ASSISTANT

## 2024-10-08 PROCEDURE — 99214 OFFICE O/P EST MOD 30 MIN: CPT | Performed by: PHYSICIAN ASSISTANT

## 2024-10-08 RX ORDER — NYSTATIN 100000 [USP'U]/G
POWDER TOPICAL 3 TIMES DAILY
Qty: 60 G | Refills: 2 | Status: SHIPPED | OUTPATIENT
Start: 2024-10-08

## 2024-10-08 NOTE — PROGRESS NOTES
New Patient Pulmonary Office Visit      Patient Name: Vicky Ortiz    Referring Physician: No ref. provider found    Chief Complaint:    Chief Complaint   Patient presents with    Lung Nodule    Shortness of Breath       History of Present Illness: Vicky Ortiz is a 68 y.o. female who is here today to establish care with Pulmonary.  Patient is a past medical history significant for hypertension, history of tobacco use, coronary artery disease, and obesity.  He was referred to pulmonary for evaluation of pulmonary nodule.  Patient states that she notes she has had a nodule for couple years now.  Denies any chest pain, nausea, fever, or chills.  Does have a significant smoking history.  But quit back in 2013.  Has some shortness of breath but it is pretty much baseline, she has a history of heart failure and COPD she has been on Trelegy and albuterol.  No frequent exacerbations.    Review of Systems:   Review of Systems   Constitutional:  Negative for chills, fatigue and fever.   HENT:  Negative for congestion and voice change.    Eyes:  Negative for blurred vision.   Respiratory:  Negative for cough, shortness of breath and wheezing.    Cardiovascular:  Negative for chest pain.   Skin:  Negative for dry skin.   Hematological:  Negative for adenopathy.   Psychiatric/Behavioral:  Negative for agitation and depressed mood.        Past Medical History:   Past Medical History:   Diagnosis Date    Allergic 1978    Arthritis 2015    Asthma 1979    Candida glabrata infection 07/29/2024    Carpal tunnel syndrome of right wrist 8/5/2024    CHF (congestive heart failure) 2013    COPD (chronic obstructive pulmonary disease) 2014    Coronary artery disease 2013    Diabetes mellitus 2015    Diabetic retinopathy     Hyperlipidemia 1997    Hypertension 1997    Knee swelling     Neuromuscular disorder 2004    Obesity 1978    Osteoarthritis 09/15/2016    Right knee Per EPIC      Tachycardia 09/15/2016    Per EPIC          Past Surgical History:   Past Surgical History:   Procedure Laterality Date    CARDIAC CATHETERIZATION  2018    CARDIAC SURGERY  2019    COLONOSCOPY  2013    FRACTURE SURGERY  2018    PILONIDAL CYSTECTOMY      TONSILLECTOMY  1961    WRIST SURGERY  plate placed in broken arm throuhgh wrist       Family History:   Family History   Problem Relation Age of Onset    Cancer Mother         Breast    Miscarriages / Stillbirths Mother     Hypertension Mother     Hyperlipidemia Father     Hypertension Father     Alcohol abuse Brother     Hypertension Brother     Angina Brother     Hyperlipidemia Brother     Hypertension Brother     Asthma Maternal Aunt     COPD Maternal Aunt     Alcohol abuse Maternal Uncle     Alcohol abuse Maternal Uncle     Early death Paternal Uncle         early 50s    Diabetes Maternal Grandmother     Stroke Paternal Grandmother     Early death Paternal Grandfather         early 50s       Social History:   Social History     Socioeconomic History    Marital status:    Tobacco Use    Smoking status: Former     Current packs/day: 0.00     Average packs/day: 0.8 packs/day for 37.2 years (27.9 ttl pk-yrs)     Types: Cigarettes     Start date: 7/15/1976     Quit date: 10/4/2013     Years since quittin.0     Passive exposure: Never    Smokeless tobacco: Never   Vaping Use    Vaping status: Never Used   Substance and Sexual Activity    Alcohol use: Never    Drug use: Never    Sexual activity: Not Currently     Partners: Male     Birth control/protection: None     Comment: Infertile       Medications:     Current Outpatient Medications:     ASPIRIN 81 PO, Take 1 tablet by mouth Daily., Disp: , Rfl:     carvedilol (COREG) 12.5 MG tablet, Take 1 tablet by mouth 2 (Two) Times a Day With Meals., Disp: 180 tablet, Rfl: 3    cholecalciferol (VITAMIN D3) 250 MCG (13721 UT) capsule, Take 1 capsule by mouth Daily., Disp: , Rfl:     Fluticasone-Umeclidin-Vilant (TRELEGY ELLIPTA) 200-62.5-25 MCG/ACT  "inhaler, Inhale 1 puff Daily., Disp: , Rfl:     lisinopril (PRINIVIL,ZESTRIL) 10 MG tablet, Take 1 tablet by mouth Daily., Disp: , Rfl:     metFORMIN (GLUCOPHAGE) 1000 MG tablet, Take 1 tablet by mouth 2 (Two) Times a Day With Meals., Disp: 60 tablet, Rfl: 5    Ozempic, 0.25 or 0.5 MG/DOSE, 2 MG/3ML solution pen-injector, Inject 0.5 mg under the skin into the appropriate area as directed 1 (One) Time Per Week., Disp: 3 mL, Rfl: 2    pravastatin (PRAVACHOL) 40 MG tablet, Take 1 tablet by mouth Daily., Disp: , Rfl:     Proventil  (90 Base) MCG/ACT inhaler, Inhale 2 puffs Every 4 (Four) Hours As Needed for Wheezing or Shortness of Air., Disp: , Rfl:     torsemide (DEMADEX) 20 MG tablet, Take 1 tablet by mouth Daily., Disp: , Rfl:     Allergies:   Allergies   Allergen Reactions    Atorvastatin Nausea And Vomiting and Unknown - Low Severity    Jardiance [Empagliflozin] Rash    Shellfish-Derived Products GI Bleeding and Rash       Physical Exam:  Vital Signs:   Vitals:    10/08/24 0819   BP: 122/78   Pulse: 72   Resp: 16   Temp: 97 °F (36.1 °C)   SpO2: 98%  Comment: room air at resting   Weight: 108 kg (238 lb)   Height: 160 cm (62.99\")       Physical Exam  Vitals and nursing note reviewed.   Constitutional:       General: She is not in acute distress.     Appearance: She is well-developed and normal weight. She is not ill-appearing or toxic-appearing.   HENT:      Head: Normocephalic and atraumatic.   Cardiovascular:      Rate and Rhythm: Normal rate and regular rhythm.      Pulses: Normal pulses.      Heart sounds: Normal heart sounds. No murmur heard.     No friction rub. No gallop.   Pulmonary:      Effort: Pulmonary effort is normal. No respiratory distress.      Breath sounds: Normal breath sounds. No wheezing, rhonchi or rales.   Musculoskeletal:      Right lower leg: No edema.      Left lower leg: No edema.   Skin:     General: Skin is warm and dry.   Neurological:      Mental Status: She is alert and " oriented to person, place, and time.         Immunization History   Administered Date(s) Administered    Fluzone  >6mos 10/13/2010    Tdap 06/30/2021       Results Review:   - I personally reviewed the pts imaging from CT scan from 2022 through 2024, shows a stable 11 mm left upper lobe noncalcified nodule  - I personally reviewed the pts PFT from 10/8/2024 showed mild obstruction without restriction and normal DLCO  - I personally reviewed the pts chart with regards to evaluation by the patient's PCP    Assessment / Plan:   Diagnoses and all orders for this visit:    1. Left upper lobe 11 mm noncalcified nodule (2022) (Primary)  -The left upper lobe lung nodules been stable since 2022, this is a benign process.  But I would recommend we follow-up with lung cancer screening as noted below on a yearly basis until she has quit smoking for 15 years.  She does not need the CT scan that was ordered for January 2025    2. Mucopurulent chronic bronchitis  -Patient has gold stage 3 class A COPD  -Continue Trelegy 200 mcg 1 puff once daily with albuterol every 4 hours as needed for medication management  -latest PFTs as noted above  -Patient counseled on monitoring for COPD exacerbation and treatment plan  -Recommend 30 minutes cardiovascular exercise per day    3. Chronic systolic (congestive) heart failure  -Recommend following with cardiology, weigh herself daily, implementing a low-sodium diet and continuing her torsemide.  Blood pressure well-controlled today.  This does complicate her breathing and if she were to gain fluid shortness of breath would drastically worsen.  I did explain this in detail on today's visit.    4. Personal history of smoking  -Ordered low-dose lung cancer screening CT scan for done 1 year from now.      Follow Up:   Return in about 1 year (around 10/8/2025) for CT Chest with next visit.     KELLY Lee DO  Pulmonary and Critical Care Medicine  Note Electronically Signed    Part of this  note may be an electronic transcription/translation of spoken language to printed text using the Dragon Dictation System.

## 2024-10-08 NOTE — PROGRESS NOTES
[facility] Medicine    Vicky Ortiz  1955   [id number]      Patient Care Team:  Clemencia Robins PA-C as PCP - General (Physician Assistant)  Jesus Gonzalez MD as Consulting Physician (Cardiology)  He Lee DO as Consulting Physician (Pulmonary Disease)    Chief Complaint::   Chief Complaint   Patient presents with    Annual Exam     Last ecg 9/9/24    Hypertension        HPI    Vicky Ortiz is a 68-year-old female date of birth 1955 who presents today for routine physical.  Past medical history significant for type 2 diabetes, hyperlipidemia, chronic systolic congestive heart failure, hypertension, mild intermittent asthma, chronic bronchitis.  Used to be an executive assistantant.  She has worked in [place] and in [employer] and [employer].  Her last job was at [employer].  She is  with 2 sons who are both adopted ages 45 and 44 years old.  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  Has 40 pounds on Ozempic.  Currently taking 0.5 mg weekly.  She denies any symptoms.  She is compliant with her medications.  She is due for mammogram, bone density, and colonoscopy.  She believes her last colonoscopy was 11 years ago.  Fasting lab work obtained in July has been reviewed.  History of thyroid nodules.  Last ultrasound August 2023.  Patient expressed concern over neck thickness.  She denies difficulty swallowing.  She denies chest pain, shortness of breath, palpitations, or headaches.    Patient Active Problem List   Diagnosis    Diabetes mellitus    Hyperlipidemia    Chronic systolic (congestive) heart failure    Essential hypertension    Trigger middle finger of right hand    Screening for osteoporosis    Mild intermittent asthma without complication    Personal history of smoking    Thyroid nodule    Carpal tunnel syndrome of right wrist    Left upper lobe 11 mm noncalcified nodule (2022)    Mucopurulent chronic bronchitis     Routine medical exam        Past Medical History:   Diagnosis Date    Allergic 1978    Arthritis 2015    Asthma 1979    Candida glabrata infection 2024    Carpal tunnel syndrome of right wrist 2024    CHF (congestive heart failure)     COPD (chronic obstructive pulmonary disease)     Coronary artery disease     Diabetes mellitus     Diabetic retinopathy     Hyperlipidemia 1997    Hypertension     Knee swelling     Neuromuscular disorder 2004    Obesity 1978    Osteoarthritis 09/15/2016    Right knee Per EPIC      Tachycardia 09/15/2016    Per EPIC         Past Surgical History:   Procedure Laterality Date    CARDIAC CATHETERIZATION  2018    CARDIAC SURGERY  2019    COLONOSCOPY      FRACTURE SURGERY  2018    PILONIDAL CYSTECTOMY      TONSILLECTOMY  1961    WRIST SURGERY  plate placed in broken arm throuhgh wrist       Family History   Problem Relation Age of Onset    Cancer Mother         Breast    Miscarriages / Stillbirths Mother     Hypertension Mother     Hyperlipidemia Father     Hypertension Father     Alcohol abuse Brother     Hypertension Brother     Angina Brother     Hyperlipidemia Brother     Hypertension Brother     Asthma Maternal Aunt     COPD Maternal Aunt     Alcohol abuse Maternal Uncle     Alcohol abuse Maternal Uncle     Early death Paternal Uncle         early 50s    Diabetes Maternal Grandmother     Stroke Paternal Grandmother     Early death Paternal Grandfather         early 50s       Social History     Socioeconomic History    Marital status:    Tobacco Use    Smoking status: Former     Current packs/day: 0.00     Average packs/day: 0.8 packs/day for 37.2 years (27.9 ttl pk-yrs)     Types: Cigarettes     Start date: 7/15/1976     Quit date: 10/4/2013     Years since quittin.0     Passive exposure: Never    Smokeless tobacco: Never   Vaping Use    Vaping status: Never Used   Substance and Sexual Activity    Alcohol use: Never    Drug use: Never     "Sexual activity: Not Currently     Partners: Male     Birth control/protection: None     Comment: Infertile       Allergies   Allergen Reactions    Atorvastatin Nausea And Vomiting and Unknown - Low Severity    Jardiance [Empagliflozin] Rash    Shellfish-Derived Products GI Bleeding and Rash       Review of Systems   Constitutional:  Positive for unexpected weight loss. Negative for activity change, appetite change, diaphoresis, fatigue and unexpected weight gain.   HENT:  Negative for hearing loss.    Eyes:  Negative for visual disturbance.   Respiratory:  Negative for chest tightness and shortness of breath.    Cardiovascular:  Negative for chest pain, palpitations and leg swelling.   Gastrointestinal:  Negative for abdominal pain, blood in stool, GERD and indigestion.   Endocrine: Negative for cold intolerance and heat intolerance.   Genitourinary:  Negative for dysuria and hematuria.   Musculoskeletal:  Negative for arthralgias and myalgias.   Skin:  Negative for skin lesions.   Neurological:  Negative for tremors, seizures, syncope, speech difficulty, weakness, headache, memory problem and confusion.   Hematological:  Does not bruise/bleed easily.   Psychiatric/Behavioral:  Negative for sleep disturbance and depressed mood. The patient is not nervous/anxious.         Vital Signs  Vitals:    10/08/24 1102   BP: 112/70   BP Location: Left arm   Patient Position: Sitting   Cuff Size: Adult   Pulse: 66   Temp: 97.5 °F (36.4 °C)   TempSrc: Infrared   SpO2: 95%   Weight: 108 kg (237 lb 6.4 oz)   Height: 160 cm (62.99\")   PainSc: 0-No pain     Body mass index is 42.06 kg/m².        Advance Care Planning   ACP discussion was held with the patient during this visit. Patient does not have an advance directive, information provided.       Current Outpatient Medications:     ASPIRIN 81 PO, Take 1 tablet by mouth Daily., Disp: , Rfl:     carvedilol (COREG) 12.5 MG tablet, Take 1 tablet by mouth 2 (Two) Times a Day With " Meals., Disp: 180 tablet, Rfl: 3    cholecalciferol (VITAMIN D3) 250 MCG (06963 UT) capsule, Take 1 capsule by mouth Daily., Disp: , Rfl:     Fluticasone-Umeclidin-Vilant (TRELEGY ELLIPTA) 200-62.5-25 MCG/ACT inhaler, Inhale 1 puff Daily., Disp: , Rfl:     lisinopril (PRINIVIL,ZESTRIL) 10 MG tablet, Take 1 tablet by mouth Daily., Disp: , Rfl:     metFORMIN (GLUCOPHAGE) 1000 MG tablet, Take 1 tablet by mouth 2 (Two) Times a Day With Meals., Disp: 60 tablet, Rfl: 5    Ozempic, 0.25 or 0.5 MG/DOSE, 2 MG/3ML solution pen-injector, Inject 0.5 mg under the skin into the appropriate area as directed 1 (One) Time Per Week., Disp: 3 mL, Rfl: 2    pravastatin (PRAVACHOL) 40 MG tablet, Take 1 tablet by mouth Daily., Disp: , Rfl:     Proventil  (90 Base) MCG/ACT inhaler, Inhale 2 puffs Every 4 (Four) Hours As Needed for Wheezing or Shortness of Air., Disp: , Rfl:     torsemide (DEMADEX) 20 MG tablet, Take 1 tablet by mouth Daily., Disp: , Rfl:     nystatin (MYCOSTATIN) 626314 UNIT/GM powder, Apply  topically to the appropriate area as directed 3 (Three) Times a Day., Disp: 60 g, Rfl: 2    Physical Exam  Vitals reviewed.   Constitutional:       Appearance: Normal appearance. She is well-developed.   HENT:      Head: Normocephalic and atraumatic.      Right Ear: Hearing, tympanic membrane, ear canal and external ear normal.      Left Ear: Hearing, tympanic membrane, ear canal and external ear normal.      Nose: Nose normal.      Mouth/Throat:      Mouth: Mucous membranes are moist.      Pharynx: Oropharynx is clear. Uvula midline.   Eyes:      General: Lids are normal.      Conjunctiva/sclera: Conjunctivae normal.      Pupils: Pupils are equal, round, and reactive to light.   Cardiovascular:      Rate and Rhythm: Normal rate and regular rhythm.      Heart sounds: Normal heart sounds.   Pulmonary:      Effort: Pulmonary effort is normal.      Breath sounds: Normal breath sounds.   Abdominal:      General: Bowel sounds are  normal.      Palpations: Abdomen is soft.   Musculoskeletal:         General: Normal range of motion.      Cervical back: Full passive range of motion without pain, normal range of motion and neck supple.   Skin:     General: Skin is warm and dry.      Findings: Rash present.             Comments: Erythematous rash under pannus   Neurological:      Mental Status: She is alert and oriented to person, place, and time.      Deep Tendon Reflexes: Reflexes are normal and symmetric.   Psychiatric:         Speech: Speech normal.         Behavior: Behavior normal.         Thought Content: Thought content normal.         Judgment: Judgment normal.          ACE III MINI            Results Review:    No results found for this or any previous visit (from the past 672 hour(s)).  Procedures    Medication Review: Medications reviewed and noted    Social History     Socioeconomic History    Marital status:    Tobacco Use    Smoking status: Former     Current packs/day: 0.00     Average packs/day: 0.8 packs/day for 37.2 years (27.9 ttl pk-yrs)     Types: Cigarettes     Start date: 7/15/1976     Quit date: 10/4/2013     Years since quittin.0     Passive exposure: Never    Smokeless tobacco: Never   Vaping Use    Vaping status: Never Used   Substance and Sexual Activity    Alcohol use: Never    Drug use: Never    Sexual activity: Not Currently     Partners: Male     Birth control/protection: None     Comment: Infertile        Assessment/Plan:    Diagnoses and all orders for this visit:    1. Routine medical exam (Primary)  Overview:  She declines immunizations-will get [facility].  Labs reviewed with patient from July.        2. Left upper lobe 11 mm noncalcified nodule ()  Overview:  Just repeated CT scan of lungs. Results are not available at this time.      3. Mild intermittent asthma without complication    4. Mucopurulent chronic bronchitis  Overview:  Stage III COPD.  Has seen pulmonologist today.  Continue  Trelegy 200-62.5-25 daily, albuterol as needed.      5. Chronic systolic (congestive) heart failure  Overview:  Pacemaker/defibrillator-carvedilol 12.5 BID.  Uses torsemide 20mg daily, but can increase if needed, per cardiology.      6. Type 2 diabetes mellitus without complication, without long-term current use of insulin  Overview:  Diagnosed 2004. Metformin 1000 mg twice daily, Ozempic 0.5 mg weekly subcu, pravastatin 20 mg daily    Orders:  -     Ambulatory Referral to Ophthalmology    7. Essential hypertension  Overview:  Takes lisinopril 10mg daily.       8. Mixed hyperlipidemia  Overview:  Continue pravastatin 40mg nightly.      9. Encounter for screening mammogram for malignant neoplasm of breast  -     Mammo Screening Digital Tomosynthesis Bilateral With CAD; Future    10. Post-menopausal  -     DEXA Bone Density Axial; Future    11. Colon cancer screening  -     Ambulatory Referral For Screening Colonoscopy    12. Multiple thyroid nodules  -     US Thyroid; Future    13. Dermatitis  -     nystatin (MYCOSTATIN) 188991 UNIT/GM powder; Apply  topically to the appropriate area as directed 3 (Three) Times a Day.  Dispense: 60 g; Refill: 2         Patient Instructions   BMI for Adults  Body mass index (BMI) is a number found using a person's weight and height. BMI can help tell how much of a person's weight is made up of fat. BMI does not measure body fat directly. It is used instead of tests that directly measure body fat, which can be difficult and expensive.  What are BMI measurements used for?  BMI is useful to:  Find out if your weight puts you at higher risk for medical problems.  Help recommend changes, such as in diet and exercise. This can help you reach a healthy weight. BMI screening can be done again to see if these changes are working.  How is BMI calculated?  Your height and weight are measured. The BMI is found from those numbers. This can be done with U[place] or metric measurements. Note that charts  "and online BMI calculators are available to help you find your BMI quickly and easily without doing these calculations.  To calculate your BMI in U.[place] measurements:  Measure your weight in pounds (lb).  Multiply the number of pounds by 703.  So, for an adult who weighs 150 lb, multiply that number by 703: 150 x 703, which equals 105,450.  Measure your height in inches. Then multiply that number by itself to get a measurement called \"inches squared.\"  So, for an adult who is 70 inches tall, the \"inches squared\" measurement is 70 inches x 70 inches, which equals 4,900 inches squared.  Divide the total from step 2 (number of lb x 703) by the total from step 3 (inches squared): 105,450 ÷ 4,900 = 21.5. This is your BMI.  To calculate your BMI in metric measurements:    Measure your weight in kilograms (kg).  For this example, the weight is 70 kg.  Measure your height in meters (m). Then multiply that number by itself to get a measurement called \"meters squared.\"  So, for an adult who is 1.75 m tall, the \"meters squared\" measurement is 1.75 m x 1.75 m, which equals 3.1 meters squared.  Divide the number of kilograms (your weight) by the meters squared number. In this example: 70 ÷ 3.1 = 22.6. This is your BMI.  What do the results mean?  BMI charts are used to see if you are underweight, normal weight, overweight, or obese. The following guidelines will be used:  Underweight: BMI less than 18.5.  Normal weight: BMI between 18.5 and 24.9.  Overweight: BMI between 25 and 29.9.  Obese: BMI of 30 or above.  BMI is a tool and cannot diagnose a condition. Talk with your health care provider about what your BMI means for you. Keep these notes in mind:  Weight includes fat and muscle. Someone with a muscular build, such as an athlete, may have a BMI that is higher than 24.9. In cases like these, BMI is not a correct measure of body fat.  If you have a BMI of 25 or higher, your provider may need to do more testing to find out if " excess body fat is the cause.  BMI is measured the same way for males and females. Females usually have more body fat than males of the same height and weight.  Where to find more information  For more information about BMI, including tools to quickly find your BMI, go to:  Centers for Disease Control and Prevention: cdc.gov  American Heart Association: heart.org  National Heart, Lung, and Blood [place]: nhlbi.nih.gov  This information is not intended to replace advice given to you by your health care provider. Make sure you discuss any questions you have with your health care provider.  Document Revised: 09/07/2023 Document Reviewed: 08/31/2023  [employer] Patient Education © 2024 [employer]  Advance Directive    Advance directives are legal documents that allow you to make decisions about your health care and medical treatment in case you become unable to communicate for yourself. Advance directives let your wishes be known to family, friends, and health care providers.  Discussing and writing advance directives should happen over time rather than all at once. Advance directives can be changed and updated at any time. There are different types of advance directives, such as:  Medical power of .  Living will.  Do not resuscitate (DNR) order or do not attempt resuscitation (DNAR) order.  Health care proxy and medical power of   A health care proxy is also called a health care agent. This person is appointed to make medical decisions for you when you are unable to make decisions for yourself. Generally, people ask a trusted friend or family member to act as their proxy and represent their preferences. Make sure you have an agreement with your trusted person to act as your proxy. A proxy may have to make a medical decision on your behalf if your wishes are not known.  A medical power of , also called a durable power of  for health care, is a legal document that names your health care  proxy. Depending on the laws in your state, the document may need to be:  Signed.  Notarized.  Dated.  Copied.  Witnessed.  Incorporated into your medical record.  You may also want to appoint a trusted person to manage your money in the event you are unable to do so. This is called a durable power of  for finances. It is a separate legal document from the durable power of  for health care. You may choose your health care proxy or someone different to act as your agent in money matters.  If you do not appoint a proxy, or there is a concern that the proxy is not acting in your best interest, a court may appoint a guardian to act on your behalf.  Living will  A living will is a set of instructions that state your wishes about medical care when you cannot express them yourself. Health care providers should keep a copy of your living will in your medical record. You may want to give a copy to family members or friends. To alert caregivers in case of an emergency, you can place a card in your wallet to let them know that you have a living will and where they can find it. A living will is used if you become:  Terminally ill.  Disabled.  Unable to communicate or make decisions.  The following decisions should be included in your living will:  To use or not to use life support equipment, such as dialysis machines and breathing machines (ventilators).  Whether you want a DNR or DNAR order. This tells health care providers not to use cardiopulmonary resuscitation (CPR) if breathing or heartbeat stops.  To use or not to use tube feeding.  To be given or not to be given food and fluids.  Whether you want comfort (palliative) care when the goal becomes comfort rather than a cure.  Whether you want to donate your organs and tissues.  A living will does not give instructions for distributing your money and property if you should pass away.  DNR or DNAR  A DNR or DNAR order is a request not to have CPR in the event  that your heart stops beating or you stop breathing. If a DNR or DNAR order has not been made and shared, a health care provider will try to help any patient whose heart has stopped or who has stopped breathing. If you plan to have surgery, talk with your health care provider about how your DNR or DNAR order will be followed if problems occur.  What if I do not have an advance directive?  Some states assign family decision makers to act on your behalf if you do not have an advance directive. Each state has its own laws about advance directives. You may want to check with your health care provider, , or state representative about the laws in your state.  Summary  Advance directives are legal documents that allow you to make decisions about your health care and medical treatment in case you become unable to communicate for yourself.  The process of discussing and writing advance directives should happen over time. You can change and update advance directives at any time.  Advance directives may include a medical power of , a living will, and a DNR or DNAR order.  This information is not intended to replace advice given to you by your health care provider. Make sure you discuss any questions you have with your health care provider.  Document Revised: 09/21/2021 Document Reviewed: 09/21/2021  [employer] Patient Education © 2024 [employer]       Plan of care reviewed with patient at the conclusion of today's visit. Education was provided regarding diagnosis, management, and any prescribed or recommended OTC medications.Patient verbalizes understanding of and agreement with management plan.             Clemencia Robins PA-C      Note: Part of this note may be an electronic transcription/translation of spoken language to printed text using the Dragon Dictation system.

## 2024-10-08 NOTE — PATIENT INSTRUCTIONS
"BMI for Adults  Body mass index (BMI) is a number found using a person's weight and height. BMI can help tell how much of a person's weight is made up of fat. BMI does not measure body fat directly. It is used instead of tests that directly measure body fat, which can be difficult and expensive.  What are BMI measurements used for?  BMI is useful to:  Find out if your weight puts you at higher risk for medical problems.  Help recommend changes, such as in diet and exercise. This can help you reach a healthy weight. BMI screening can be done again to see if these changes are working.  How is BMI calculated?  Your height and weight are measured. The BMI is found from those numbers. This can be done with U.S. or metric measurements. Note that charts and online BMI calculators are available to help you find your BMI quickly and easily without doing these calculations.  To calculate your BMI in U.S. measurements:  Measure your weight in pounds (lb).  Multiply the number of pounds by 703.  So, for an adult who weighs 150 lb, multiply that number by 703: 150 x 703, which equals 105,450.  Measure your height in inches. Then multiply that number by itself to get a measurement called \"inches squared.\"  So, for an adult who is 70 inches tall, the \"inches squared\" measurement is 70 inches x 70 inches, which equals 4,900 inches squared.  Divide the total from step 2 (number of lb x 703) by the total from step 3 (inches squared): 105,450 ÷ 4,900 = 21.5. This is your BMI.  To calculate your BMI in metric measurements:    Measure your weight in kilograms (kg).  For this example, the weight is 70 kg.  Measure your height in meters (m). Then multiply that number by itself to get a measurement called \"meters squared.\"  So, for an adult who is 1.75 m tall, the \"meters squared\" measurement is 1.75 m x 1.75 m, which equals 3.1 meters squared.  Divide the number of kilograms (your weight) by the meters squared number. In this example: 70 " ÷ 3.1 = 22.6. This is your BMI.  What do the results mean?  BMI charts are used to see if you are underweight, normal weight, overweight, or obese. The following guidelines will be used:  Underweight: BMI less than 18.5.  Normal weight: BMI between 18.5 and 24.9.  Overweight: BMI between 25 and 29.9.  Obese: BMI of 30 or above.  BMI is a tool and cannot diagnose a condition. Talk with your health care provider about what your BMI means for you. Keep these notes in mind:  Weight includes fat and muscle. Someone with a muscular build, such as an athlete, may have a BMI that is higher than 24.9. In cases like these, BMI is not a correct measure of body fat.  If you have a BMI of 25 or higher, your provider may need to do more testing to find out if excess body fat is the cause.  BMI is measured the same way for males and females. Females usually have more body fat than males of the same height and weight.  Where to find more information  For more information about BMI, including tools to quickly find your BMI, go to:  Centers for Disease Control and Prevention: cdc.gov  American Heart Association: heart.org  National Heart, Lung, and Blood [place]: nhlbi.nih.gov  This information is not intended to replace advice given to you by your health care provider. Make sure you discuss any questions you have with your health care provider.  Document Revised: 09/07/2023 Document Reviewed: 08/31/2023  [employer] Patient Education © 2024 [employer]  Advance Directive    Advance directives are legal documents that allow you to make decisions about your health care and medical treatment in case you become unable to communicate for yourself. Advance directives let your wishes be known to family, friends, and health care providers.  Discussing and writing advance directives should happen over time rather than all at once. Advance directives can be changed and updated at any time. There are different types of advance directives,  such as:  Medical power of .  Living will.  Do not resuscitate (DNR) order or do not attempt resuscitation (DNAR) order.  Health care proxy and medical power of   A health care proxy is also called a health care agent. This person is appointed to make medical decisions for you when you are unable to make decisions for yourself. Generally, people ask a trusted friend or family member to act as their proxy and represent their preferences. Make sure you have an agreement with your trusted person to act as your proxy. A proxy may have to make a medical decision on your behalf if your wishes are not known.  A medical power of , also called a durable power of  for health care, is a legal document that names your health care proxy. Depending on the laws in your state, the document may need to be:  Signed.  Notarized.  Dated.  Copied.  Witnessed.  Incorporated into your medical record.  You may also want to appoint a trusted person to manage your money in the event you are unable to do so. This is called a durable power of  for finances. It is a separate legal document from the durable power of  for health care. You may choose your health care proxy or someone different to act as your agent in money matters.  If you do not appoint a proxy, or there is a concern that the proxy is not acting in your best interest, a court may appoint a guardian to act on your behalf.  Living will  A living will is a set of instructions that state your wishes about medical care when you cannot express them yourself. Health care providers should keep a copy of your living will in your medical record. You may want to give a copy to family members or friends. To alert caregivers in case of an emergency, you can place a card in your wallet to let them know that you have a living will and where they can find it. A living will is used if you become:  Terminally ill.  Disabled.  Unable to communicate or  make decisions.  The following decisions should be included in your living will:  To use or not to use life support equipment, such as dialysis machines and breathing machines (ventilators).  Whether you want a DNR or DNAR order. This tells health care providers not to use cardiopulmonary resuscitation (CPR) if breathing or heartbeat stops.  To use or not to use tube feeding.  To be given or not to be given food and fluids.  Whether you want comfort (palliative) care when the goal becomes comfort rather than a cure.  Whether you want to donate your organs and tissues.  A living will does not give instructions for distributing your money and property if you should pass away.  DNR or DNAR  A DNR or DNAR order is a request not to have CPR in the event that your heart stops beating or you stop breathing. If a DNR or DNAR order has not been made and shared, a health care provider will try to help any patient whose heart has stopped or who has stopped breathing. If you plan to have surgery, talk with your health care provider about how your DNR or DNAR order will be followed if problems occur.  What if I do not have an advance directive?  Some states assign family decision makers to act on your behalf if you do not have an advance directive. Each state has its own laws about advance directives. You may want to check with your health care provider, , or state representative about the laws in your state.  Summary  Advance directives are legal documents that allow you to make decisions about your health care and medical treatment in case you become unable to communicate for yourself.  The process of discussing and writing advance directives should happen over time. You can change and update advance directives at any time.  Advance directives may include a medical power of , a living will, and a DNR or DNAR order.  This information is not intended to replace advice given to you by your health care provider.  Make sure you discuss any questions you have with your health care provider.  Document Revised: 09/21/2021 Document Reviewed: 09/21/2021  Elsevier Patient Education © 2024 [employer] Inc.

## 2024-10-09 ENCOUNTER — PREP FOR SURGERY (OUTPATIENT)
Dept: OTHER | Facility: HOSPITAL | Age: 69
End: 2024-10-09
Payer: COMMERCIAL

## 2024-10-09 DIAGNOSIS — Z12.11 SCREEN FOR COLON CANCER: Primary | ICD-10-CM

## 2024-10-23 ENCOUNTER — HOSPITAL ENCOUNTER (OUTPATIENT)
Dept: ULTRASOUND IMAGING | Facility: HOSPITAL | Age: 69
Discharge: HOME | End: 2024-10-23
Admitting: PHYSICIAN ASSISTANT
Payer: COMMERCIAL

## 2024-10-23 DIAGNOSIS — E04.2 MULTIPLE THYROID NODULES: ICD-10-CM

## 2024-10-23 PROCEDURE — 76536 US EXAM OF HEAD AND NECK: CPT

## 2024-10-28 RX ORDER — SEMAGLUTIDE 0.68 MG/ML
0.5 INJECTION, SOLUTION SUBCUTANEOUS WEEKLY
Qty: 3 ML | Refills: 2 | Status: SHIPPED | OUTPATIENT
Start: 2024-10-28 | End: 2024-10-28 | Stop reason: SDUPTHER

## 2024-10-28 RX ORDER — SEMAGLUTIDE 0.68 MG/ML
0.5 INJECTION, SOLUTION SUBCUTANEOUS WEEKLY
Qty: 3 ML | Refills: 2 | Status: SHIPPED | OUTPATIENT
Start: 2024-10-28

## 2024-11-13 ENCOUNTER — TELEPHONE (OUTPATIENT)
Dept: INTERNAL MEDICINE | Facility: CLINIC | Age: 69
End: 2024-11-13
Payer: COMMERCIAL

## 2024-11-13 DIAGNOSIS — I10 ESSENTIAL HYPERTENSION: ICD-10-CM

## 2024-11-13 DIAGNOSIS — I50.22 CHRONIC SYSTOLIC (CONGESTIVE) HEART FAILURE: ICD-10-CM

## 2024-11-13 DIAGNOSIS — J45.20 MILD INTERMITTENT ASTHMA WITHOUT COMPLICATION: ICD-10-CM

## 2024-11-13 RX ORDER — TORSEMIDE 20 MG/1
20 TABLET ORAL DAILY
Qty: 90 TABLET | Refills: 1 | Status: SHIPPED | OUTPATIENT
Start: 2024-11-13

## 2024-11-13 RX ORDER — LISINOPRIL 10 MG/1
10 TABLET ORAL DAILY
Qty: 90 TABLET | Refills: 1 | Status: SHIPPED | OUTPATIENT
Start: 2024-11-13

## 2024-11-13 RX ORDER — ALBUTEROL SULFATE 90 UG/1
2 INHALANT RESPIRATORY (INHALATION) EVERY 4 HOURS PRN
Qty: 18 G | Refills: 3 | Status: SHIPPED | OUTPATIENT
Start: 2024-11-13

## 2024-11-13 RX ORDER — ALBUTEROL SULFATE 90 MCG
2 HFA AEROSOL WITH ADAPTER (GRAM) INHALATION EVERY 4 HOURS PRN
Qty: 18 G | Refills: 1 | Status: SHIPPED | OUTPATIENT
Start: 2024-11-13 | End: 2024-11-13

## 2024-11-13 NOTE — TELEPHONE ENCOUNTER
Caller: [facility] [phone] - [address] - [phone]  - [phone]    Relationship: Pharmacy    Best call back number: [phone]     What was the call regarding: PHARMACY STATES THAT THE PROVENTIL WITH THE TANMAY OF 1 THE PATIENT HAS NEVER HAD AND THEY AREN'T ABLE TO GET. WOULD LIKE TO KNOW IF THEY CAN HAVE A PRESCRIPTION FOR ALBUTEROL WHICH SHE'S HAD IN THE PAST.    Is it okay if the provider responds through MyChart: NO

## 2024-11-15 ENCOUNTER — HOSPITAL ENCOUNTER (OUTPATIENT)
Dept: MAMMOGRAPHY | Facility: HOSPITAL | Age: 69
Discharge: HOME | End: 2024-11-15
Admitting: PHYSICIAN ASSISTANT
Payer: COMMERCIAL

## 2024-11-15 DIAGNOSIS — Z12.31 ENCOUNTER FOR SCREENING MAMMOGRAM FOR MALIGNANT NEOPLASM OF BREAST: ICD-10-CM

## 2024-11-15 PROCEDURE — 77067 SCR MAMMO BI INCL CAD: CPT

## 2024-11-15 PROCEDURE — 77063 BREAST TOMOSYNTHESIS BI: CPT

## 2024-12-05 RX ORDER — VALACYCLOVIR HYDROCHLORIDE 1 G/1
1000 TABLET, FILM COATED ORAL 3 TIMES DAILY
Qty: 6 TABLET | Refills: 3 | Status: SHIPPED | OUTPATIENT
Start: 2024-12-05 | End: 2024-12-07

## 2025-01-09 ENCOUNTER — LAB (OUTPATIENT)
Dept: LAB | Facility: HOSPITAL | Age: 70
End: 2025-01-09
Payer: COMMERCIAL

## 2025-01-09 ENCOUNTER — OFFICE VISIT (OUTPATIENT)
Dept: INTERNAL MEDICINE | Facility: CLINIC | Age: 70
End: 2025-01-09
Payer: COMMERCIAL

## 2025-01-09 VITALS
OXYGEN SATURATION: 96 % | SYSTOLIC BLOOD PRESSURE: 114 MMHG | DIASTOLIC BLOOD PRESSURE: 70 MMHG | BODY MASS INDEX: 42.42 KG/M2 | WEIGHT: 239.4 LBS | TEMPERATURE: 98 F | HEIGHT: 63 IN | HEART RATE: 72 BPM

## 2025-01-09 DIAGNOSIS — E78.2 MIXED HYPERLIPIDEMIA: ICD-10-CM

## 2025-01-09 DIAGNOSIS — I10 ESSENTIAL HYPERTENSION: ICD-10-CM

## 2025-01-09 DIAGNOSIS — I50.22 CHRONIC SYSTOLIC (CONGESTIVE) HEART FAILURE: ICD-10-CM

## 2025-01-09 DIAGNOSIS — Z77.21 EXPOSURE TO BLOOD: ICD-10-CM

## 2025-01-09 DIAGNOSIS — E11.9 TYPE 2 DIABETES MELLITUS WITHOUT COMPLICATION, WITHOUT LONG-TERM CURRENT USE OF INSULIN: Primary | ICD-10-CM

## 2025-01-09 LAB
EXPIRATION DATE: ABNORMAL
HAV IGM SERPL QL IA: NORMAL
HBA1C MFR BLD: 6.5 % (ref 4.5–5.7)
HBV CORE IGM SERPL QL IA: NORMAL
HBV SURFACE AG SERPL QL IA: NORMAL
HCV AB SER QL: NORMAL
HIV 1+2 AB+HIV1 P24 AG SERPL QL IA: NORMAL
Lab: ABNORMAL

## 2025-01-09 PROCEDURE — 83036 HEMOGLOBIN GLYCOSYLATED A1C: CPT | Performed by: PHYSICIAN ASSISTANT

## 2025-01-09 PROCEDURE — G0432 EIA HIV-1/HIV-2 SCREEN: HCPCS

## 2025-01-09 PROCEDURE — 99214 OFFICE O/P EST MOD 30 MIN: CPT | Performed by: PHYSICIAN ASSISTANT

## 2025-01-09 PROCEDURE — 80074 ACUTE HEPATITIS PANEL: CPT

## 2025-01-09 PROCEDURE — 36415 COLL VENOUS BLD VENIPUNCTURE: CPT

## 2025-01-09 NOTE — PROGRESS NOTES
[facility]    Vicky Ortiz  1955   [id number]      Patient Care Team:  Clemencia Robins PA-C as PCP - General (Physician Assistant)  Jesus Gonzalez MD as Consulting Physician (Cardiology)  He Lee DO as Consulting Physician (Pulmonary Disease)    Chief Complaint::   Chief Complaint   Patient presents with    Type 2 diabetes     3 months follow-up    Shoulder Pain     Left side,When moving          HPI  History of Present Illness  Vicky is a 69 year old female who presents for evaluation of diabetes, right shoulder pain, and potential exposure to bloodborne pathogens. PMH significant for hypertension, Type II DM.    She is currently on a regimen of Ozempic 0.5 mg, with a recent 90-day supply procured from a new supplier. Despite this, she reports no further weight loss. She also notes that the medication appears to slow her bowel movements, but overall, she maintains regularity.    She reports experiencing shoulder pain, which she attributes to a long drive to [place] in October. The pain persists, and she identifies a specific pressure point that exacerbates the discomfort when pressed. She has not sought massage therapy due to time constraints and financial considerations. She does not engage in stretching exercises and has not been able to do anything for herself in the past few months. She expresses concern about the potential cognitive effects of muscle relaxers. She inquires about the possibility of using the exercise room in her apartment complex for physical therapy.    She recounts a traumatic incident involving her 's niece, who attempted suicide in their home on 12/14/2024. The niece, a known drug addict, used a knife and a piece of ceramic from a broken lamp to inflict injuries on herself. The patient was advised to clean the blood-stained area with peroxide. She expresses concern about potential contamination from the blood, despite having  taken precautions such as wearing gloves and a mask during the cleaning process. She managed to clean the area within two days but is unsure if she has been exposed to any pathogens. She also questions whether the odor from the blood could indicate the presence of bacteria or other harmful substances.         Patient Active Problem List   Diagnosis    Diabetes mellitus    Hyperlipidemia    Chronic systolic (congestive) heart failure    Essential hypertension    Trigger middle finger of right hand    Screening for osteoporosis    Mild intermittent asthma without complication    Personal history of smoking    Thyroid nodule    Carpal tunnel syndrome of right wrist    Left upper lobe 11 mm noncalcified nodule (2022)    Mucopurulent chronic bronchitis    Routine medical exam    Screen for colon cancer    Exposure to blood        Past Medical History:   Diagnosis Date    Allergic 1978    Arthritis 2015    Asthma 1979    Candida glabrata infection 07/29/2024    Carpal tunnel syndrome of right wrist 08/05/2024    CHF (congestive heart failure) 2013    COPD (chronic obstructive pulmonary disease) 2014    Coronary artery disease 2013    Diabetes mellitus 2015    Diabetic retinopathy     Hyperlipidemia 1997    Hypertension 1997    Knee swelling     Neuromuscular disorder 2004    Obesity 1978    Osteoarthritis 09/15/2016    Right knee Per EPIC      Tachycardia 09/15/2016    Per EPIC         Past Surgical History:   Procedure Laterality Date    CARDIAC CATHETERIZATION  2018    CARDIAC SURGERY  2019    COLONOSCOPY  2013    FRACTURE SURGERY  2018    PILONIDAL CYSTECTOMY      TONSILLECTOMY  1961    WRIST SURGERY  plate placed in broken arm [facility] wrist       Family History   Problem Relation Age of Onset    Breast cancer Mother         mid 50's    Cancer Mother         Breast    Miscarriages / Stillbirths Mother     Hypertension Mother     Hyperlipidemia Father     Hypertension Father     Alcohol abuse Brother     Hypertension  Brother     Angina Brother     Hyperlipidemia Brother     Hypertension Brother     Diabetes Maternal Grandmother     Stroke Paternal Grandmother     Asthma Maternal Aunt     COPD Maternal Aunt     Early death Paternal Grandfather         early 50s    Alcohol abuse Maternal Uncle     Alcohol abuse Maternal Uncle     Early death Paternal Uncle         early 50s    Breast cancer Cousin     Ovarian cancer Cousin     Breast cancer Paternal Great-Grandmother        Social History     Socioeconomic History    Marital status:    Tobacco Use    Smoking status: Former     Current packs/day: 0.00     Average packs/day: 0.8 packs/day for 37.2 years (27.9 ttl pk-yrs)     Types: Cigarettes     Start date: 7/15/1976     Quit date: 10/4/2013     Years since quittin.2     Passive exposure: Never    Smokeless tobacco: Never   Vaping Use    Vaping status: Never Used   Substance and Sexual Activity    Alcohol use: Never    Drug use: Never    Sexual activity: Not Currently     Partners: Male     Birth control/protection: None     Comment: Infertile       Allergies   Allergen Reactions    Atorvastatin Nausea And Vomiting and Unknown - Low Severity    Jardiance [Empagliflozin] Rash    Shellfish-Derived Products GI Bleeding and Rash       Review of Systems   Constitutional:  Positive for unexpected weight gain. Negative for activity change, appetite change, diaphoresis, fatigue and unexpected weight loss.   HENT:  Negative for hearing loss.    Eyes:  Negative for visual disturbance.   Respiratory:  Negative for chest tightness and shortness of breath.    Cardiovascular:  Negative for chest pain, palpitations and leg swelling.   Gastrointestinal:  Negative for abdominal pain, blood in stool, GERD and indigestion.   Endocrine: Negative for cold intolerance and heat intolerance.   Genitourinary:  Negative for dysuria and hematuria.   Musculoskeletal:  Positive for arthralgias. Negative for myalgias.   Skin:  Negative for skin  "lesions.   Neurological:  Negative for tremors, seizures, syncope, speech difficulty, weakness, headache, memory problem and confusion.   Hematological:  Does not bruise/bleed easily.   Psychiatric/Behavioral:  Positive for stress. Negative for sleep disturbance and depressed mood. The patient is not nervous/anxious.           Vital Signs  Vitals:    01/09/25 1106   BP: 114/70   BP Location: Left arm   Patient Position: Sitting   Cuff Size: Adult   Pulse: 72   Temp: 98 °F (36.7 °C)   TempSrc: Temporal   SpO2: 96%   Weight: 109 kg (239 lb 6.4 oz)   Height: 160 cm (62.99\")   PainSc: 6  Comment: when moving   PainLoc: Shoulder     Body mass index is 42.42 kg/m².        Advance Care Planning   ACP discussion was held with the patient during this visit. Patient does not have an advance directive, information provided.       Current Outpatient Medications:     albuterol sulfate  (90 Base) MCG/ACT inhaler, Inhale 2 puffs Every 4 (Four) Hours As Needed for Wheezing., Disp: 18 g, Rfl: 3    ASPIRIN 81 PO, Take 1 tablet by mouth Daily., Disp: , Rfl:     carvedilol (COREG) 12.5 MG tablet, Take 1 tablet by mouth 2 (Two) Times a Day With Meals., Disp: 180 tablet, Rfl: 3    cholecalciferol (VITAMIN D3) 250 MCG (95192 UT) capsule, Take 1 capsule by mouth Daily., Disp: , Rfl:     Fluticasone-Umeclidin-Vilant (TRELEGY ELLIPTA) 200-62.5-25 MCG/ACT inhaler, Inhale 1 puff Daily., Disp: , Rfl:     lisinopril (PRINIVIL,ZESTRIL) 10 MG tablet, Take 1 tablet by mouth Daily., Disp: 90 tablet, Rfl: 1    metFORMIN (GLUCOPHAGE) 1000 MG tablet, Take 1 tablet by mouth 2 (Two) Times a Day With Meals., Disp: 60 tablet, Rfl: 5    nystatin (MYCOSTATIN) 191729 UNIT/GM powder, Apply  topically to the appropriate area as directed 3 (Three) Times a Day., Disp: 60 g, Rfl: 2    pravastatin (PRAVACHOL) 40 MG tablet, Take 1 tablet by mouth Daily., Disp: , Rfl:     torsemide (DEMADEX) 20 MG tablet, Take 1 tablet by mouth Daily., Disp: 90 tablet, Rfl: " 1    metFORMIN (GLUCOPHAGE) 1000 MG tablet, Take 1 tablet by mouth 2 (Two) Times a Day With Meals. (Patient not taking: Reported on 1/9/2025), Disp: 60 tablet, Rfl: 5    Tirzepatide 2.5 MG/0.5ML solution auto-injector, Inject 2.5 mg under the skin into the appropriate area as directed 1 (One) Time Per Week., Disp: 2 mL, Rfl: 0    Physical Exam  Vitals reviewed.   Constitutional:       Appearance: Normal appearance. She is well-developed. She is obese.   HENT:      Head: Normocephalic and atraumatic.      Right Ear: Hearing, tympanic membrane, ear canal and external ear normal.      Left Ear: Hearing, tympanic membrane, ear canal and external ear normal.      Nose: Nose normal.      Mouth/Throat:      Pharynx: Uvula midline.   Eyes:      General: Lids are normal.      Conjunctiva/sclera: Conjunctivae normal.      Pupils: Pupils are equal, round, and reactive to light.   Cardiovascular:      Rate and Rhythm: Normal rate and regular rhythm.      Heart sounds: Normal heart sounds.   Pulmonary:      Effort: Pulmonary effort is normal.      Breath sounds: Normal breath sounds.   Abdominal:      General: Bowel sounds are normal.      Palpations: Abdomen is soft.   Musculoskeletal:         General: Tenderness present. Normal range of motion.      Cervical back: Full passive range of motion without pain, normal range of motion and neck supple.   Skin:     General: Skin is warm and dry.   Neurological:      Mental Status: She is alert and oriented to person, place, and time.      Deep Tendon Reflexes: Reflexes are normal and symmetric.   Psychiatric:         Speech: Speech normal.         Behavior: Behavior normal.         Thought Content: Thought content normal.         Judgment: Judgment normal.          ACE III MINI            Results Review:    Recent Results (from the past 4 weeks)   POC Glycosylated Hemoglobin (Hb A1C)    Collection Time: 01/09/25 11:32 AM    Specimen: Blood   Result Value Ref Range    Hemoglobin A1C  6.5 (A) 4.5 - 5.7 %    Lot Number [id number]     Expiration Date 2026      Procedures    Medication Review: Medications reviewed and noted    Social History     Socioeconomic History    Marital status:    Tobacco Use    Smoking status: Former     Current packs/day: 0.00     Average packs/day: 0.8 packs/day for 37.2 years (27.9 ttl pk-yrs)     Types: Cigarettes     Start date: 7/15/1976     Quit date: 10/4/2013     Years since quittin.2     Passive exposure: Never    Smokeless tobacco: Never   Vaping Use    Vaping status: Never Used   Substance and Sexual Activity    Alcohol use: Never    Drug use: Never    Sexual activity: Not Currently     Partners: Male     Birth control/protection: None     Comment: Infertile        Assessment/Plan:    Diagnoses and all orders for this visit:    1. Type 2 diabetes mellitus without complication, without long-term current use of insulin (Primary)  Overview:  Diagnosed .     Orders:  -     POC Glycosylated Hemoglobin (Hb A1C)  -     Tirzepatide 2.5 MG/0.5ML solution auto-injector; Inject 2.5 mg under the skin into the appropriate area as directed 1 (One) Time Per Week.  Dispense: 2 mL; Refill: 0    2. Essential hypertension  Overview:  Takes lisinopril 10mg daily.       3. Exposure to blood  -     Hepatitis panel, acute; Future  -     HIV-1/O/2 Ag/Ab w Reflex; Future    4. Chronic systolic (congestive) heart failure  Overview:  Pacemaker/defibrillator-carvedilol 12.5 BID.  Uses torsemide 20mg daily, but can increase if needed, per cardiology.      5. Mixed hyperlipidemia  Overview:  Continue pravastatin 40mg nightly.           Assessment & Plan  1. Diabetes mellitus.  Her A1c level has increased by 0.1, indicating a need for more effective glycemic control. She is currently on Ozempic 0.5 mg but has not experienced significant weight loss or improved blood sugar control. She will be transitioned from semaglutide to tirzepatide, starting at the lowest dose. She  is advised to provide an update via [employer] in 30 days to assess the effectiveness of the new medication before any potential dosage increase.    2. Right shoulder pain.  The pain is likely due to stress tension rather than an injury from driving. She is advised to perform stretching exercises for her neck and shoulder, including door frame stretches and side stretches. She will communicate any progress or issues via [employer] after 30 days.    3. Potential exposure to bloodborne pathogens.  Tests for hepatitis and HIV will be conducted to rule out any potential exposure to bloodborne pathogens. A recheck will be scheduled in 3 months to ensure no infection has occurred.    Follow-up  The patient will follow up in 3 months.     Plan of care reviewed with patient at the conclusion of today's visit. Education was provided regarding diagnosis, management, and any prescribed or recommended OTC medications.Patient verbalizes understanding of and agreement with management plan.         I have seen Vicky on this date of service:preparing for the visit, reviewing tests, obtaining and/or reviewing a separately obtained history, performing a medically appropriate examination and/or evaluation , counseling and educating the patient/family/caregiver, ordering medications, tests, or procedures, referring and communicating with other health care professionals , and documenting information in the medical record    Clemencia Robins PA-C    Patient or patient representative verbalized consent for the use of Ambient Listening during the visit with  Clemencia Robins PA-C for chart documentation. 1/9/2025  18:59 EST

## 2025-01-17 DIAGNOSIS — J45.20 MILD INTERMITTENT ASTHMA WITHOUT COMPLICATION: ICD-10-CM

## 2025-01-17 NOTE — TELEPHONE ENCOUNTER
Rx Refill Note  Requested Prescriptions     Pending Prescriptions Disp Refills    Fluticasone-Umeclidin-Vilant (TRELEGY ELLIPTA) 200-62.5-25 MCG/ACT inhaler       Sig: Inhale 1 puff Daily.      Last office visit with prescribing clinician: 1/9/2025   Last telemedicine visit with prescribing clinician: Visit date not found   Next office visit with prescribing clinician: 4/15/2025                         Would you like a call back once the refill request has been completed: [] Yes [] No    If the office needs to give you a call back, can they leave a voicemail: [] Yes [] No    Waleska Antunez MA  01/17/25, 12:56 EST

## 2025-01-20 RX ORDER — SODIUM, POTASSIUM,MAG SULFATES 17.5-3.13G
1 SOLUTION, RECONSTITUTED, ORAL ORAL TAKE AS DIRECTED
Qty: 354 ML | Refills: 0 | Status: SHIPPED | OUTPATIENT
Start: 2025-01-20

## 2025-01-23 DIAGNOSIS — E78.2 MIXED HYPERLIPIDEMIA: ICD-10-CM

## 2025-01-23 RX ORDER — PRAVASTATIN SODIUM 40 MG
40 TABLET ORAL DAILY
Qty: 90 TABLET | Refills: 1 | Status: SHIPPED | OUTPATIENT
Start: 2025-01-23

## 2025-01-26 DIAGNOSIS — E11.9 TYPE 2 DIABETES MELLITUS WITHOUT COMPLICATION, WITHOUT LONG-TERM CURRENT USE OF INSULIN: Primary | ICD-10-CM

## 2025-01-26 RX ORDER — SEMAGLUTIDE 0.68 MG/ML
0.5 INJECTION, SOLUTION SUBCUTANEOUS WEEKLY
Qty: 3 ML | Refills: 2 | Status: SHIPPED | OUTPATIENT
Start: 2025-01-26

## 2025-01-30 ENCOUNTER — TELEPHONE (OUTPATIENT)
Dept: ENDOCRINOLOGY | Facility: CLINIC | Age: 70
End: 2025-01-30
Payer: COMMERCIAL

## 2025-01-30 ENCOUNTER — PRE-ADMISSION TESTING (OUTPATIENT)
Dept: PREADMISSION TESTING | Facility: HOSPITAL | Age: 70
End: 2025-01-30
Payer: COMMERCIAL

## 2025-01-30 VITALS — WEIGHT: 242.18 LBS | HEIGHT: 63 IN | BODY MASS INDEX: 42.91 KG/M2

## 2025-01-30 LAB
DEPRECATED RDW RBC AUTO: 41.2 FL (ref 37–54)
ERYTHROCYTE [DISTWIDTH] IN BLOOD BY AUTOMATED COUNT: 12.6 % (ref 12.3–15.4)
HCT VFR BLD AUTO: 37.7 % (ref 34–46.6)
HGB BLD-MCNC: 12.2 G/DL (ref 12–15.9)
MCH RBC QN AUTO: 28.8 PG (ref 26.6–33)
MCHC RBC AUTO-ENTMCNC: 32.4 G/DL (ref 31.5–35.7)
MCV RBC AUTO: 88.9 FL (ref 79–97)
PLATELET # BLD AUTO: 160 10*3/MM3 (ref 140–450)
PMV BLD AUTO: 10.9 FL (ref 6–12)
POTASSIUM SERPL-SCNC: 4 MMOL/L (ref 3.5–5.2)
RBC # BLD AUTO: 4.24 10*6/MM3 (ref 3.77–5.28)
WBC NRBC COR # BLD AUTO: 5.82 10*3/MM3 (ref 3.4–10.8)

## 2025-01-30 PROCEDURE — 85027 COMPLETE CBC AUTOMATED: CPT

## 2025-01-30 PROCEDURE — 36415 COLL VENOUS BLD VENIPUNCTURE: CPT

## 2025-01-30 PROCEDURE — 84132 ASSAY OF SERUM POTASSIUM: CPT

## 2025-01-30 NOTE — TELEPHONE ENCOUNTER
PATIENTS  WOULD LIKE TO SPEAK WITH [facility] ABOUT OZEMPIC PRESCRIPTION. PATIENT RECEIVED HER OZEMPIC MEDICATION BY MAIL TODAY AND WAS CHARGED $35 FOR A 30 DAY SUPPLY. PATIENTS  ALSO GETS [facility]PIC FROM [facility] AND HE GETS 90 DAY SUPPLY OF OZEMPIC FOR $25. HE WANTS TO KNOW WHY PATIENT DID NOT GET A 90 DAY SUPPLY AS ITS CHEAPER THEN GETTING 30 DAY SUPPLY. THEY WOULD LIKE A CALL BACK TO DISCUSS THIS ISSUE. PHONE NUMBER [phone]

## 2025-02-04 ENCOUNTER — SPECIALTY PHARMACY (OUTPATIENT)
Age: 70
End: 2025-02-04
Payer: COMMERCIAL

## 2025-02-05 ENCOUNTER — ANESTHESIA EVENT (OUTPATIENT)
Dept: GASTROENTEROLOGY | Facility: HOSPITAL | Age: 70
End: 2025-02-05
Payer: COMMERCIAL

## 2025-02-05 RX ORDER — FAMOTIDINE 10 MG/ML
20 INJECTION, SOLUTION INTRAVENOUS ONCE
Status: CANCELLED | OUTPATIENT
Start: 2025-02-05 | End: 2025-02-05

## 2025-02-05 RX ORDER — SODIUM CHLORIDE 0.9 % (FLUSH) 0.9 %
10 SYRINGE (ML) INJECTION AS NEEDED
Status: CANCELLED | OUTPATIENT
Start: 2025-02-05

## 2025-02-05 RX ORDER — FAMOTIDINE 20 MG/1
20 TABLET, FILM COATED ORAL ONCE
Status: CANCELLED | OUTPATIENT
Start: 2025-02-05 | End: 2025-02-05

## 2025-02-05 RX ORDER — MIDAZOLAM HYDROCHLORIDE 1 MG/ML
0.5 INJECTION, SOLUTION INTRAMUSCULAR; INTRAVENOUS
Status: CANCELLED | OUTPATIENT
Start: 2025-02-05

## 2025-02-05 RX ORDER — SODIUM CHLORIDE 0.9 % (FLUSH) 0.9 %
10 SYRINGE (ML) INJECTION EVERY 12 HOURS SCHEDULED
Status: CANCELLED | OUTPATIENT
Start: 2025-02-05

## 2025-02-05 RX ORDER — SODIUM CHLORIDE, SODIUM LACTATE, POTASSIUM CHLORIDE, CALCIUM CHLORIDE 600; 310; 30; 20 MG/100ML; MG/100ML; MG/100ML; MG/100ML
9 INJECTION, SOLUTION INTRAVENOUS CONTINUOUS
Status: CANCELLED | OUTPATIENT
Start: 2025-02-06 | End: 2025-02-06

## 2025-02-05 RX ORDER — LIDOCAINE HYDROCHLORIDE 10 MG/ML
0.5 INJECTION, SOLUTION EPIDURAL; INFILTRATION; INTRACAUDAL; PERINEURAL ONCE AS NEEDED
Status: CANCELLED | OUTPATIENT
Start: 2025-02-05

## 2025-02-06 ENCOUNTER — ANESTHESIA (OUTPATIENT)
Dept: GASTROENTEROLOGY | Facility: HOSPITAL | Age: 70
End: 2025-02-06
Payer: COMMERCIAL

## 2025-02-06 ENCOUNTER — HOSPITAL ENCOUNTER (OUTPATIENT)
Facility: HOSPITAL | Age: 70
Setting detail: HOSPITAL OUTPATIENT SURGERY
Discharge: HOME | End: 2025-02-06
Attending: INTERNAL MEDICINE | Admitting: INTERNAL MEDICINE
Payer: COMMERCIAL

## 2025-02-06 VITALS
DIASTOLIC BLOOD PRESSURE: 84 MMHG | HEART RATE: 66 BPM | TEMPERATURE: 98 F | RESPIRATION RATE: 16 BRPM | SYSTOLIC BLOOD PRESSURE: 128 MMHG | OXYGEN SATURATION: 100 %

## 2025-02-06 LAB
GLUCOSE BLDC GLUCOMTR-MCNC: 152 MG/DL (ref 70–130)
GLUCOSE BLDC GLUCOMTR-MCNC: 153 MG/DL (ref 70–130)

## 2025-02-06 PROCEDURE — 45378 DIAGNOSTIC COLONOSCOPY: CPT | Performed by: INTERNAL MEDICINE

## 2025-02-06 PROCEDURE — S0260 H&P FOR SURGERY: HCPCS | Performed by: INTERNAL MEDICINE

## 2025-02-06 PROCEDURE — 25810000003 LACTATED RINGERS PER 1000 ML

## 2025-02-06 PROCEDURE — 25010000002 PROPOFOL 10 MG/ML EMULSION

## 2025-02-06 PROCEDURE — 82948 REAGENT STRIP/BLOOD GLUCOSE: CPT

## 2025-02-06 RX ORDER — ONDANSETRON 2 MG/ML
4 INJECTION INTRAMUSCULAR; INTRAVENOUS ONCE AS NEEDED
Status: DISCONTINUED | OUTPATIENT
Start: 2025-02-06 | End: 2025-02-06 | Stop reason: HOSPADM

## 2025-02-06 RX ORDER — PROPOFOL 10 MG/ML
VIAL (ML) INTRAVENOUS AS NEEDED
Status: DISCONTINUED | OUTPATIENT
Start: 2025-02-06 | End: 2025-02-06 | Stop reason: SURG

## 2025-02-06 RX ORDER — IPRATROPIUM BROMIDE AND ALBUTEROL SULFATE 2.5; .5 MG/3ML; MG/3ML
3 SOLUTION RESPIRATORY (INHALATION) ONCE AS NEEDED
Status: DISCONTINUED | OUTPATIENT
Start: 2025-02-06 | End: 2025-02-06 | Stop reason: HOSPADM

## 2025-02-06 RX ORDER — SODIUM CHLORIDE, SODIUM LACTATE, POTASSIUM CHLORIDE, CALCIUM CHLORIDE 600; 310; 30; 20 MG/100ML; MG/100ML; MG/100ML; MG/100ML
INJECTION, SOLUTION INTRAVENOUS CONTINUOUS PRN
Status: DISCONTINUED | OUTPATIENT
Start: 2025-02-06 | End: 2025-02-06 | Stop reason: SURG

## 2025-02-06 RX ADMIN — PROPOFOL INJECTABLE EMULSION 150 MCG/KG/MIN: 10 INJECTION, EMULSION INTRAVENOUS at 09:37

## 2025-02-06 RX ADMIN — PROPOFOL INJECTABLE EMULSION 50 MG: 10 INJECTION, EMULSION INTRAVENOUS at 09:36

## 2025-02-06 RX ADMIN — SODIUM CHLORIDE, POTASSIUM CHLORIDE, SODIUM LACTATE AND CALCIUM CHLORIDE: 600; 310; 30; 20 INJECTION, SOLUTION INTRAVENOUS at 09:32

## 2025-02-06 RX ADMIN — PROPOFOL INJECTABLE EMULSION 30 MG: 10 INJECTION, EMULSION INTRAVENOUS at 09:38

## 2025-02-06 NOTE — ANESTHESIA POSTPROCEDURE EVALUATION
Patient: Vicky Ortiz    Procedure Summary       Date: 02/06/25 Room / Location:  [facility]    Anesthesia Start: 0932 Anesthesia Stop: 0956    Procedure: COLONOSCOPY Diagnosis:       Screen for colon cancer      (Screen for colon cancer [Z12.11])    Surgeons: Torri Burrell MD Provider: Jonn Ramirez MD    Anesthesia Type: general ASA Status: 3            Anesthesia Type: general    Vitals  Vitals Value Taken Time   BP 98/66 02/06/25 0956   Temp 98 °F (36.7 °C) 02/06/25 0956   Pulse 73 02/06/25 0956   Resp 15 02/06/25 0956   SpO2 98 % 02/06/25 0956           Post Anesthesia Care and Evaluation    Patient location during evaluation: PACU  Patient participation: complete - patient participated  Level of consciousness: awake and alert  Pain management: adequate    Airway patency: patent  Anesthetic complications: No anesthetic complications  PONV Status: none  Cardiovascular status: hemodynamically stable and acceptable  Respiratory status: nonlabored ventilation, acceptable and nasal cannula  Hydration status: acceptable

## 2025-02-06 NOTE — H&P
[facility]: Gastroenterology  HISTORY AND PHYSICAL    Patient Name: Vicky Ortiz  : 1955  MRN: [id number]  Primary Care Physician:  Clemencia Robins PA-C  Date of admission: 2025    Subjective   Subjective     Chief Complaint: hx of polyp    HPI:    Vicky Ortiz is a 69 y.o. female pmh pacer, ICD, copd, HF diastolic greater than 55% meeting me for the first time with personal hx of polyp.  She reports has not had colonoscopy for 10 years  No GI symptoms  She reports doing well overall    Review of Systems   The following systems were reviewed and negative;  constitution, eyes, ENT, respiratory, cardiovascular, gastrointestinal, genitourinary, integument, breast, hematologic / lymphatic, musculoskeletal, neurological, behavioral/psych, endocrine, and allergies / immunologic.    Personal History     Past Medical History:   Diagnosis Date    Allergic 1978    Arthritis     Asthma 1979    Candida glabrata infection 2024    Carpal tunnel syndrome of right wrist 2024    CHF (congestive heart failure)     COPD (chronic obstructive pulmonary disease)     Coronary artery disease     Diabetes mellitus     Diabetic retinopathy     Hyperlipidemia     Hypertension     Knee swelling     Left bundle branch block (LBBB)     followed by Dr. Gonzalez    Neuromuscular disorder     Obesity 1978    Osteoarthritis 09/15/2016    Right knee Per EPIC      Tachycardia 09/15/2016    Per EPIC         Past Surgical History:   Procedure Laterality Date    CARDIAC CATHETERIZATION  2018    COLONOSCOPY      FRACTURE SURGERY  2018    PACEMAKER IMPLANTATION  2019    PILONIDAL CYSTECTOMY      TONSILLECTOMY  89    [facility]GERY  [facility]       Family History: family history includes Alcohol abuse in her brother, maternal uncle, and maternal uncle; Angina in her brother; Asthma in her maternal aunt; Breast cancer in her cousin,  mother, and paternal great-grandmother; COPD in her maternal aunt; Cancer in her mother; Diabetes in her maternal grandmother; Early death in her paternal grandfather and paternal uncle; Hyperlipidemia in her brother and father; Hypertension in her brother, brother, father, and mother; Miscarriages / Stillbirths in her mother; Ovarian cancer in her cousin; Stroke in her paternal grandmother. Otherwise pertinent FHx was reviewed and not pertinent to current issue.    Social History:  reports that she quit smoking about 11 years ago. Her smoking use included cigarettes. She started smoking about 48 years ago. She has a 27.9 pack-year smoking history. She has never been exposed to tobacco smoke. She has never used smokeless tobacco. She reports that she does not drink alcohol and does not use drugs.    Home Medications:  Aspirin, Fluticasone-Umeclidin-Vilant, Semaglutide(0.25 or 0.5MG/DOS), albuterol sulfate HFA, carvedilol, cholecalciferol, lisinopril, metFORMIN, nystatin, pravastatin, sodium-potassium-magnesium sulfates, and torsemide      Allergies:  Allergies   Allergen Reactions    Atorvastatin Nausea And Vomiting and Unknown - Low Severity    Jardiance [Empagliflozin] Rash    Shellfish-Derived Products GI Bleeding and Rash       Objective   Objective     Vitals:   Temp:  [98 °F (36.7 °C)] 98 °F (36.7 °C)  Heart Rate:  [73] 73  Resp:  [16] 16  BP: (137)/(81) 137/81   Wt Readings from Last 3 Encounters:   01/30/25 110 kg (242 lb 2.8 oz)   01/09/25 109 kg (239 lb 6.4 oz)   10/08/24 108 kg (237 lb 6.4 oz)    body mass index is unknown because there is no height or weight on file.  Physical Exam    Physical Exam  Constitutional:       Appearance: Normal appearance.   HENT:      Head:      Comments: No teeth  Abdominal:      General: Bowel sounds are normal.      Palpations: Abdomen is soft.   Neurological:      Mental Status: She is alert.           Result Review    Result Review:  I have personally reviewed the  results from the time of this admission to 2/6/2025 09:22 EST and agree with these findings:  [x]  Laboratory  []  Microbiology  []  Radiology  []  EKG/Telemetry   []  Cardiology/Vascular   []  Pathology  []  Old records  []  Other:  Most notable findings include: no anemia  Noted on ozempic held for 11 days    Assessment & Plan   Assessment / Plan     Brief Patient Summary:  Vicky Ortiz is a 69 y.o. female who here for hx of polyp.  Unfortunately I do not have report.  Discussed risk with GLP 1 agonist and discussed how this could affect preparation  Patient demonstrated understanding    Active Hospital Problems:  Active Hospital Problems    Diagnosis     **Screen for colon cancer      Plan:   Colonoscopy      VTE Prophylaxis:  No VTE prophylaxis order currently exists.        CODE STATUS:       Admission Status:  I believe this patient meets outpatient status.    Torri Burrell MD

## 2025-02-06 NOTE — ANESTHESIA PREPROCEDURE EVALUATION
Anesthesia Evaluation     Patient summary reviewed and Nursing notes reviewed   NPO Solid Status: > 8 hours  NPO Liquid Status: > 2 hours           Airway   Mallampati: I  TM distance: >3 FB  Neck ROM: full  No difficulty expected  Dental    (+) upper dentures    Pulmonary     breath sounds clear to auscultation  (+) COPD, asthma,shortness of breath  Cardiovascular     ECG reviewed  Rhythm: regular  Rate: normal    (+) pacemaker ICD interrogated 6-12 months ago, hypertension, dysrhythmias, CHF Diastolic >=55%, hyperlipidemia      Neuro/Psych  (+) numbness  GI/Hepatic/Renal/Endo    (+) obesity, morbid obesity, diabetes mellitus type 2, thyroid problem thyroid nodules    Musculoskeletal     Abdominal    Substance History      OB/GYN          Other   arthritis,     ROS/Med Hx Other: BIV AICD/pacer- no recent discharge, judith lifee1.9 yrs, last EF 60%    GLP 1 agonist last used ~2 weeks prior                Anesthesia Plan    ASA 3     general     intravenous induction     Anesthetic plan, risks, benefits, and alternatives have been provided, discussed and informed consent has been obtained with: patient.    Plan discussed with CRNA.    CODE STATUS:

## 2025-02-21 ENCOUNTER — TELEPHONE (OUTPATIENT)
Dept: INTERNAL MEDICINE | Facility: CLINIC | Age: 70
End: 2025-02-21
Payer: COMMERCIAL

## 2025-02-21 RX ORDER — SEMAGLUTIDE 0.68 MG/ML
0.5 INJECTION, SOLUTION SUBCUTANEOUS
Qty: 9 ML | Refills: 1 | Status: SHIPPED | OUTPATIENT
Start: 2025-02-21

## 2025-02-21 NOTE — TELEPHONE ENCOUNTER
Caller: [facility]    Relationship to patient: Pharmacy      Best call back number: [phone]    Provider: NANO JOYNER     Medication PA needed: OZEMPIC     Reason for call/Prior Auth: THE CALLER STATES THAT THE MEDICATION WILL NEED A PRIOR AUTHORIZATION

## 2025-02-21 NOTE — TELEPHONE ENCOUNTER
Rx Refill Note  Requested Prescriptions     Pending Prescriptions Disp Refills    Semaglutide,0.25 or 0.5MG/DOS, (Ozempic, 0.25 or 0.5 MG/DOSE,) 2 MG/3ML solution pen-injector 9 mL 1     Sig: Inject 0.5 mg under the skin into the appropriate area as directed Every 7 (Seven) Days.      Last office visit with prescribing clinician: 1/9/2025   Last telemedicine visit with prescribing clinician: Visit date not found   Next office visit with prescribing clinician: 2/21/2025                         Would you like a call back once the refill request has been completed: [] Yes [] No    If the office needs to give you a call back, can they leave a voicemail: [] Yes [] No    Shagufta Haas LPN  02/21/25, 09:16 EST

## 2025-02-21 NOTE — TELEPHONE ENCOUNTER
THE PHARMACY CALLED IN TO LET THE OFFICE KNOW THAT THE PATIENT WANT AN 84 DAY SUPPLY OF THE OZEMPIC

## 2025-02-21 NOTE — TELEPHONE ENCOUNTER
Advised patient via my chart that I submitted a prior authorization for the medication Ozempic . As soon as I have an answer from the insurance company  I will let her  know as well.

## 2025-02-26 ENCOUNTER — TELEPHONE (OUTPATIENT)
Dept: INTERNAL MEDICINE | Facility: CLINIC | Age: 70
End: 2025-02-26
Payer: COMMERCIAL

## 2025-02-26 NOTE — TELEPHONE ENCOUNTER
Advised patient via my chart that I have received a letter from the insurance company approving the medication   Ozempic pen injector. The authorization is valid from 01/26/25 to 02/25/26.   A copy of this letter will be scanned into the patient's chart .

## 2025-02-27 DIAGNOSIS — R09.89 CHEST CONGESTION: Primary | ICD-10-CM

## 2025-02-27 RX ORDER — ALBUTEROL SULFATE 1.25 MG/3ML
1 SOLUTION RESPIRATORY (INHALATION) EVERY 6 HOURS PRN
Qty: 25 EACH | Refills: 0 | Status: SHIPPED | OUTPATIENT
Start: 2025-02-27

## 2025-03-04 ENCOUNTER — APPOINTMENT (OUTPATIENT)
Dept: GENERAL RADIOLOGY | Facility: HOSPITAL | Age: 70
End: 2025-03-04
Payer: COMMERCIAL

## 2025-03-04 ENCOUNTER — TELEPHONE (OUTPATIENT)
Dept: INTERNAL MEDICINE | Facility: CLINIC | Age: 70
End: 2025-03-04
Payer: COMMERCIAL

## 2025-03-04 ENCOUNTER — HOSPITAL ENCOUNTER (EMERGENCY)
Facility: HOSPITAL | Age: 70
Discharge: HOME | End: 2025-03-05
Attending: EMERGENCY MEDICINE | Admitting: EMERGENCY MEDICINE
Payer: COMMERCIAL

## 2025-03-04 DIAGNOSIS — U07.1 COVID-19: ICD-10-CM

## 2025-03-04 DIAGNOSIS — J18.9 PNEUMONIA OF BOTH LOWER LOBES DUE TO INFECTIOUS ORGANISM: Primary | ICD-10-CM

## 2025-03-04 DIAGNOSIS — R06.02 SHORTNESS OF BREATH: ICD-10-CM

## 2025-03-04 DIAGNOSIS — R05.1 ACUTE COUGH: ICD-10-CM

## 2025-03-04 DIAGNOSIS — R91.1 LEFT UPPER LOBE PULMONARY NODULE: ICD-10-CM

## 2025-03-04 LAB
ALBUMIN SERPL-MCNC: 3.8 G/DL (ref 3.5–5.2)
ALBUMIN/GLOB SERPL: 1.2 G/DL
ALP SERPL-CCNC: 52 U/L (ref 39–117)
ALT SERPL W P-5'-P-CCNC: 20 U/L (ref 1–33)
ANION GAP SERPL CALCULATED.3IONS-SCNC: 19 MMOL/L (ref 5–15)
AST SERPL-CCNC: 22 U/L (ref 1–32)
BASOPHILS # BLD AUTO: 0.01 10*3/MM3 (ref 0–0.2)
BASOPHILS NFR BLD AUTO: 0.1 % (ref 0–1.5)
BILIRUB SERPL-MCNC: 0.5 MG/DL (ref 0–1.2)
BUN SERPL-MCNC: 10 MG/DL (ref 8–23)
BUN/CREAT SERPL: 11.1 (ref 7–25)
CALCIUM SPEC-SCNC: 9.2 MG/DL (ref 8.6–10.5)
CHLORIDE SERPL-SCNC: 97 MMOL/L (ref 98–107)
CO2 SERPL-SCNC: 19 MMOL/L (ref 22–29)
CREAT SERPL-MCNC: 0.9 MG/DL (ref 0.57–1)
D-LACTATE SERPL-SCNC: 1 MMOL/L (ref 0.5–2)
DEPRECATED RDW RBC AUTO: 39.9 FL (ref 37–54)
EGFRCR SERPLBLD CKD-EPI 2021: 70.2 ML/MIN/1.73
EOSINOPHIL # BLD AUTO: 0 10*3/MM3 (ref 0–0.4)
EOSINOPHIL NFR BLD AUTO: 0 % (ref 0.3–6.2)
ERYTHROCYTE [DISTWIDTH] IN BLOOD BY AUTOMATED COUNT: 12.6 % (ref 12.3–15.4)
GEN 5 1HR TROPONIN T REFLEX: 10 NG/L
GLOBULIN UR ELPH-MCNC: 3.3 GM/DL
GLUCOSE SERPL-MCNC: 167 MG/DL (ref 65–99)
HCT VFR BLD AUTO: 34.7 % (ref 34–46.6)
HGB BLD-MCNC: 11.6 G/DL (ref 12–15.9)
HOLD SPECIMEN: NORMAL
IMM GRANULOCYTES # BLD AUTO: 0.04 10*3/MM3 (ref 0–0.05)
IMM GRANULOCYTES NFR BLD AUTO: 0.4 % (ref 0–0.5)
LYMPHOCYTES # BLD AUTO: 0.84 10*3/MM3 (ref 0.7–3.1)
LYMPHOCYTES NFR BLD AUTO: 9.3 % (ref 19.6–45.3)
MCH RBC QN AUTO: 28.9 PG (ref 26.6–33)
MCHC RBC AUTO-ENTMCNC: 33.4 G/DL (ref 31.5–35.7)
MCV RBC AUTO: 86.5 FL (ref 79–97)
MONOCYTES # BLD AUTO: 0.56 10*3/MM3 (ref 0.1–0.9)
MONOCYTES NFR BLD AUTO: 6.2 % (ref 5–12)
NEUTROPHILS NFR BLD AUTO: 7.6 10*3/MM3 (ref 1.7–7)
NEUTROPHILS NFR BLD AUTO: 84 % (ref 42.7–76)
NRBC BLD AUTO-RTO: 0 /100 WBC (ref 0–0.2)
NT-PROBNP SERPL-MCNC: 155.1 PG/ML (ref 0–900)
PLATELET # BLD AUTO: 165 10*3/MM3 (ref 140–450)
PMV BLD AUTO: 11 FL (ref 6–12)
POTASSIUM SERPL-SCNC: 3.6 MMOL/L (ref 3.5–5.2)
PROT SERPL-MCNC: 7.1 G/DL (ref 6–8.5)
RBC # BLD AUTO: 4.01 10*6/MM3 (ref 3.77–5.28)
SODIUM SERPL-SCNC: 135 MMOL/L (ref 136–145)
TROPONIN T NUMERIC DELTA: 2 NG/L
TROPONIN T SERPL HS-MCNC: 8 NG/L
WBC NRBC COR # BLD AUTO: 9.05 10*3/MM3 (ref 3.4–10.8)
WHOLE BLOOD HOLD COAG: NORMAL
WHOLE BLOOD HOLD SPECIMEN: NORMAL

## 2025-03-04 PROCEDURE — 80053 COMPREHEN METABOLIC PANEL: CPT

## 2025-03-04 PROCEDURE — 83605 ASSAY OF LACTIC ACID: CPT

## 2025-03-04 PROCEDURE — 87076 CULTURE ANAEROBE IDENT EACH: CPT | Performed by: EMERGENCY MEDICINE

## 2025-03-04 PROCEDURE — 71045 X-RAY EXAM CHEST 1 VIEW: CPT

## 2025-03-04 PROCEDURE — 84484 ASSAY OF TROPONIN QUANT: CPT

## 2025-03-04 PROCEDURE — 84484 ASSAY OF TROPONIN QUANT: CPT | Performed by: EMERGENCY MEDICINE

## 2025-03-04 PROCEDURE — 85025 COMPLETE CBC W/AUTO DIFF WBC: CPT

## 2025-03-04 PROCEDURE — 99285 EMERGENCY DEPT VISIT HI MDM: CPT

## 2025-03-04 PROCEDURE — 93005 ELECTROCARDIOGRAM TRACING: CPT

## 2025-03-04 PROCEDURE — 93005 ELECTROCARDIOGRAM TRACING: CPT | Performed by: EMERGENCY MEDICINE

## 2025-03-04 PROCEDURE — 83880 ASSAY OF NATRIURETIC PEPTIDE: CPT

## 2025-03-04 PROCEDURE — 87040 BLOOD CULTURE FOR BACTERIA: CPT | Performed by: EMERGENCY MEDICINE

## 2025-03-04 PROCEDURE — 36415 COLL VENOUS BLD VENIPUNCTURE: CPT

## 2025-03-04 RX ORDER — BENZONATATE 100 MG/1
100 CAPSULE ORAL ONCE
Status: COMPLETED | OUTPATIENT
Start: 2025-03-04 | End: 2025-03-05

## 2025-03-04 RX ORDER — SODIUM CHLORIDE 0.9 % (FLUSH) 0.9 %
10 SYRINGE (ML) INJECTION AS NEEDED
Status: DISCONTINUED | OUTPATIENT
Start: 2025-03-04 | End: 2025-03-05 | Stop reason: HOSPADM

## 2025-03-04 RX ORDER — IPRATROPIUM BROMIDE AND ALBUTEROL SULFATE 2.5; .5 MG/3ML; MG/3ML
3 SOLUTION RESPIRATORY (INHALATION) ONCE
Status: COMPLETED | OUTPATIENT
Start: 2025-03-04 | End: 2025-03-05

## 2025-03-04 NOTE — TELEPHONE ENCOUNTER
Received a call from patient's  regarding wife stating patient is having difficulty breathing that she can't breath her chest feels very heavy patient is not feeling better since her last visit here at the office with the positive covid test stated patient has been having diarrhea and has not been able to eat over a week also stated she has been having fever at 103.1 (2 days ago) and now is at 101.1 she has been taking tylenol.she's unable to use her nebulizer as is doesn't make her breath right stated her finger nails look blue. patient's  stated he would want for patient to get seen at the hospital but wanted to give us a call regarding her symptoms.    I did advise patient's  that the patient needs to be seen at the hospital

## 2025-03-05 ENCOUNTER — APPOINTMENT (OUTPATIENT)
Dept: CT IMAGING | Facility: HOSPITAL | Age: 70
End: 2025-03-05
Payer: COMMERCIAL

## 2025-03-05 VITALS
HEART RATE: 103 BPM | HEIGHT: 64 IN | DIASTOLIC BLOOD PRESSURE: 79 MMHG | RESPIRATION RATE: 20 BRPM | SYSTOLIC BLOOD PRESSURE: 133 MMHG | BODY MASS INDEX: 39.95 KG/M2 | OXYGEN SATURATION: 93 % | WEIGHT: 234 LBS | TEMPERATURE: 100.3 F

## 2025-03-05 LAB
QT INTERVAL: 392 MS
QTC INTERVAL: 503 MS

## 2025-03-05 PROCEDURE — 25510000001 IOPAMIDOL PER 1 ML: Performed by: EMERGENCY MEDICINE

## 2025-03-05 PROCEDURE — 94640 AIRWAY INHALATION TREATMENT: CPT

## 2025-03-05 PROCEDURE — 71275 CT ANGIOGRAPHY CHEST: CPT

## 2025-03-05 RX ORDER — IOPAMIDOL 755 MG/ML
100 INJECTION, SOLUTION INTRAVASCULAR
Status: COMPLETED | OUTPATIENT
Start: 2025-03-05 | End: 2025-03-05

## 2025-03-05 RX ORDER — DOXYCYCLINE 100 MG/1
100 CAPSULE ORAL ONCE
Status: COMPLETED | OUTPATIENT
Start: 2025-03-05 | End: 2025-03-05

## 2025-03-05 RX ORDER — BENZONATATE 100 MG/1
100 CAPSULE ORAL 3 TIMES DAILY PRN
Qty: 9 CAPSULE | Refills: 0 | Status: SHIPPED | OUTPATIENT
Start: 2025-03-05 | End: 2025-03-08

## 2025-03-05 RX ORDER — DOXYCYCLINE 100 MG/1
100 CAPSULE ORAL 2 TIMES DAILY
Qty: 10 CAPSULE | Refills: 0 | Status: SHIPPED | OUTPATIENT
Start: 2025-03-05 | End: 2025-03-10

## 2025-03-05 RX ADMIN — IPRATROPIUM BROMIDE AND ALBUTEROL SULFATE 3 ML: 2.5; .5 SOLUTION RESPIRATORY (INHALATION) at 00:10

## 2025-03-05 RX ADMIN — DOXYCYCLINE 100 MG: 100 CAPSULE ORAL at 02:15

## 2025-03-05 RX ADMIN — AMOXICILLIN AND CLAVULANATE POTASSIUM 1 TABLET: 875; 125 TABLET, FILM COATED ORAL at 02:15

## 2025-03-05 RX ADMIN — IOPAMIDOL 80 ML: 755 INJECTION, SOLUTION INTRAVENOUS at 01:27

## 2025-03-05 RX ADMIN — BENZONATATE 100 MG: 100 CAPSULE ORAL at 02:15

## 2025-03-05 NOTE — DISCHARGE INSTRUCTIONS
Take prescribed antibiotics Augmentin and doxycycline for treatment of pneumonia.  Take prescribed Tessalon for treatment of cough. I recommend you take Tylenol 650 mg every 6 hours and ibuprofen 400 mg every 6 hours as needed for pain unless you have a contraindication to these medications  Call to schedule close follow-up appointment with your primary care doctor.  Return to the ER as needed for new or worsening symptoms

## 2025-03-05 NOTE — ED PROVIDER NOTES
[place]    EMERGENCY DEPARTMENT ENCOUNTER      Pt Name: Vicky Ortiz  MRN: [id number]  YOB: 1955  Date of evaluation: 3/4/2025  Provider: Quinn Sanchez MD    CHIEF COMPLAINT       Chief Complaint   Patient presents with    Shortness of Breath         HISTORY OF PRESENT ILLNESS   Vicky Ortiz is a 69 y.o. female who presents to the emergency department for evaluation of 7-8 days of shortness of breath, nausea, diarrhea, fatigue.  Her home covid test was positive 8 days ago.  Has had daily fevers including today.   She is currently short of breath and has been using home albuterol which is helpful.  She is having pain in her chest that does not radiate with breathing.      REVIEW OF SYSTEMS     ROS:  A chief complaint appropriate review of systems was completed and is negative except as noted in the HPI.      PAST MEDICAL HISTORY     Past Medical History:   Diagnosis Date    Allergic 1978    Arthritis 2015    Asthma 1979    Candida glabrata infection 07/29/2024    Carpal tunnel syndrome of right wrist 08/05/2024    CHF (congestive heart failure) 2013    COPD (chronic obstructive pulmonary disease) 2014    Coronary artery disease 2013    Diabetes mellitus 2015    Diabetic retinopathy     Hyperlipidemia 1997    Hypertension 1997    Knee swelling     Left bundle branch block (LBBB)     followed by Dr. Gonzalez    Neuromuscular disorder 2004    Obesity 1978    Osteoarthritis 09/15/2016    Right knee Per [facility]      Tachycardia 09/15/2016    Per [facility]           SURGICAL HISTORY       Past Surgical History:   Procedure Laterality Date    CARDIAC CATHETERIZATION  2018    COLONOSCOPY  2013    COLONOSCOPY N/A 2/6/2025    Procedure: COLONOSCOPY;  Surgeon: Torri Burrell MD;  Location: formerly [facility] ENDOSCOPY;  Service: Gastroenterology;  Laterality: N/A;    FRACTURE SURGERY  2018    PACEMAKER IMPLANTATION  2019    PILONIDAL CYSTECTOMY      TONSILLECTOMY  1961    WRIST SURGERY  plate placed in broken arm [facility]  wrist         CURRENT MEDICATIONS     No current facility-administered medications for this encounter.    Current Outpatient Medications:     albuterol (ACCUNEB) 1.25 MG/3ML nebulizer solution, Take 3 mL by nebulization Every 6 (Six) Hours As Needed for Shortness of Air., Disp: 25 each, Rfl: 0    albuterol sulfate  (90 Base) MCG/ACT inhaler, Inhale 2 puffs Every 4 (Four) Hours As Needed for Wheezing., Disp: 18 g, Rfl: 3    amoxicillin-clavulanate (AUGMENTIN) 875-125 MG per tablet, Take 1 tablet by mouth 2 (Two) Times a Day for 5 days., Disp: 10 tablet, Rfl: 0    ASPIRIN 81 PO, Take 1 tablet by mouth Daily., Disp: , Rfl:     benzonatate (TESSALON) 100 MG capsule, Take 1 capsule by mouth 3 (Three) Times a Day As Needed for Cough for up to 3 days., Disp: 9 capsule, Rfl: 0    carvedilol (COREG) 12.5 MG tablet, Take 1 tablet by mouth 2 (Two) Times a Day With Meals., Disp: 180 tablet, Rfl: 3    cholecalciferol (VITAMIN D3) 250 MCG (04270 UT) capsule, Take 1 capsule by mouth Daily., Disp: , Rfl:     doxycycline (MONODOX) 100 MG capsule, Take 1 capsule by mouth 2 (Two) Times a Day for 5 days., Disp: 10 capsule, Rfl: 0    Fluticasone-Umeclidin-Vilant (TRELEGY ELLIPTA) 200-62.5-25 MCG/ACT inhaler, Inhale 1 puff Daily., Disp: 60 each, Rfl: 1    lisinopril (PRINIVIL,ZESTRIL) 10 MG tablet, Take 1 tablet by mouth Daily., Disp: 90 tablet, Rfl: 1    metFORMIN (GLUCOPHAGE) 1000 MG tablet, Take 1 tablet by mouth 2 (Two) Times a Day With Meals., Disp: 60 tablet, Rfl: 5    metFORMIN (GLUCOPHAGE) 1000 MG tablet, Take 1 tablet by mouth 2 (Two) Times a Day With Meals., Disp: 60 tablet, Rfl: 5    nystatin (MYCOSTATIN) 987810 UNIT/GM powder, Apply  topically to the appropriate area as directed 3 (Three) Times a Day., Disp: 60 g, Rfl: 2    Ozempic, 0.25 or 0.5 MG/DOSE, 2 MG/3ML solution pen-injector, Inject 0.5 mg under the skin into the appropriate area as directed 1 (One) Time Per Week., Disp: 3 mL, Rfl: 2    pravastatin (PRAVACHOL)  40 MG tablet, Take 1 tablet by mouth Daily., Disp: 90 tablet, Rfl: 1    Semaglutide,0.25 or 0.5MG/DOS, (Ozempic, 0.25 or 0.5 MG/DOSE,) 2 MG/3ML solution pen-injector, Inject 0.5 mg under the skin into the appropriate area as directed Every 7 (Seven) Days., Disp: 9 mL, Rfl: 1    sodium-potassium-magnesium sulfates (Suprep Bowel Prep Kit) 17.5-3.13-1.6 GM/177ML solution oral solution, Take 1 bottle by mouth Take As Directed., Disp: 354 mL, Rfl: 0    torsemide (DEMADEX) 20 MG tablet, Take 1 tablet by mouth Daily., Disp: 90 tablet, Rfl: 1    ALLERGIES     Atorvastatin, Jardiance [empagliflozin], and Shellfish-derived products    FAMILY HISTORY       Family History   Problem Relation Age of Onset    Breast cancer Mother         mid 50's    Cancer Mother         Breast    Miscarriages / Stillbirths Mother     Hypertension Mother     Hyperlipidemia Father     Hypertension Father     Alcohol abuse Brother     Hypertension Brother     Angina Brother     Hyperlipidemia Brother     Hypertension Brother     Diabetes Maternal Grandmother     Stroke Paternal Grandmother     Asthma Maternal Aunt     COPD Maternal Aunt     Early death Paternal Grandfather         early 50s    Alcohol abuse Maternal Uncle     Alcohol abuse Maternal Uncle     Early death Paternal Uncle         early 50s    Breast cancer Cousin     Ovarian cancer Cousin     Breast cancer Paternal Great-Grandmother           SOCIAL HISTORY       Social History     Socioeconomic History    Marital status:    Tobacco Use    Smoking status: Former     Current packs/day: 0.00     Average packs/day: 0.8 packs/day for 37.2 years (27.9 ttl pk-yrs)     Types: Cigarettes     Start date: 7/15/1976     Quit date: 10/4/2013     Years since quittin.4     Passive exposure: Never    Smokeless tobacco: Never   Vaping Use    Vaping status: Never Used   Substance and Sexual Activity    Alcohol use: Never    Drug use: Never    Sexual activity: Not Currently     Partners: Male  "    Birth control/protection: None     Comment: Infertile         PHYSICAL EXAM    (up to 7 for level 4, 8 or more for level 5)     Vitals:    03/04/25 1947 03/05/25 0010   BP: 133/79    BP Location: Left arm    Patient Position: Sitting    Pulse: 103    Resp: 22 20   Temp: 100.3 °F (37.9 °C)    TempSrc: Oral    SpO2: 93%    Weight: 106 kg (234 lb)    Height: 162.6 cm (64\")        Physical Exam  Constitutional:       General: She is not in acute distress.     Appearance: She is obese.   HENT:      Head: Normocephalic and atraumatic.   Eyes:      Conjunctiva/sclera: Conjunctivae normal.      Pupils: Pupils are equal, round, and reactive to light.   Cardiovascular:      Rate and Rhythm: Normal rate and regular rhythm.      Pulses: Normal pulses.      Heart sounds: No murmur heard.     No gallop.   Pulmonary:      Effort: Pulmonary effort is normal. No respiratory distress.      Comments: Tachypneic without accessory muscle use or increased work of breathing.  No wheezing, no rhonchi, speaks in full sentences   Abdominal:      General: Abdomen is flat. There is no distension.      Tenderness: There is no abdominal tenderness.   Musculoskeletal:         General: No swelling or deformity. Normal range of motion.      Right lower leg: No edema.      Left lower leg: No edema.   Skin:     General: Skin is warm and dry.      Capillary Refill: Capillary refill takes less than 2 seconds.   Neurological:      General: No focal deficit present.      Mental Status: She is alert and oriented to person, place, and time.   Psychiatric:         Mood and Affect: Mood normal.         Behavior: Behavior normal.            DIAGNOSTIC RESULTS     EKG: All EKGs are interpreted by the Emergency Department Physician who either signs or Co-signs this chart in the absence of a cardiologist.    ECG 12 Lead ED Triage Standing Order; SOA   Final Result   Test Reason : ED Triage Standing Order~   Blood Pressure :   */*   mmHG   Vent. Rate :  99 " BPM     Atrial Rate :  99 BPM      P-R Int : 120 ms          QRS Dur : 114 ms       QT Int : 392 ms       P-R-T Axes :  73 -52  66 degrees     QTcB Int : 503 ms      Atrial-sensed ventricular-paced rhythm   Abnormal ECG   When compared with ECG of 26-May-2015 10:27,   Electronic ventricular pacemaker has replaced Sinus rhythm   Confirmed by MD SUSAN, MADHAVI ([id number]) on 3/5/2025 6:11:08 AM      Referred By: EDMD           Confirmed By: MADHAVI DAVIS MD            RADIOLOGY:   [x] Radiologist's Report Reviewed:  CT Angiogram Chest Pulmonary Embolism   Final Result   Impression:   1.No evidence of pulmonary embolism.   2.Mild bibasilar pneumonia. Follow-up to resolution recommended.   3.Stable subpleural pulmonary nodule present within the left upper lobe measuring 9 mm. CT follow-up in 6 months is recommended to evaluate for stability.   4.Ancillary findings as described above.                  Electronically Signed: Yesika Samuel MD     3/5/2025 1:38 AM EST     Workstation ID: [id number]      XR Chest 1 View   Final Result   Impression:   No active cardiopulmonary disease. A 3-lead pacemaker/AICD is in place.         Electronically Signed: Robb Dalal DO     3/4/2025 8:46 PM EST     Workstation ID: [id number]          I ordered and independently reviewed the above noted radiographic studies.        LABS:  I independently interpreted all laboratory studies conducted during this ED visit.  The results of these studies can be seen below and my independent interpretation in the ED course      EMERGENCY DEPARTMENT COURSE and DIFFERENTIAL DIAGNOSIS/MDM:   Vitals:  AS OF 06:12 EST    BP - 133/79  HR - 103  TEMP - 100.3 °F (37.9 °C) (Oral)  O2 SATS - 93%        Discussion below represents my analysis of pertinent findings related to patient's condition, differential diagnosis, treatment plan and final disposition.      Differential diagnosis:  The differential diagnosis associated with the patient's presentation includes: covid  19 infection, superimposed bacterial lung infection, CHF exacerbation, pulmonary embolism, pleural effusion, copd exacerbation.        Independent interpretations (ECG/rhythm strip/X-ray/US/CT scan): See ED course      Additional sources:  Discussed/obtained information from independent historians:   [] Spouse:   [] Parent:   [] Friend:   [] EMS:   [x] Other: COPD, CHF    External record review:  1/9/2025 reviewed most recent outpatient provider note, seen in internal medicine clinic for follow-up of her diabetes and shoulder pain      Patient's care impacted by:   [] Diabetes   [] Hypertension   [] Coronary Artery Disease   [] Cancer   [] Other:     Care significantly affected by Social Determinants of Health (housing and economic circumstances, unemployment)    [] Yes     [] No   If yes, Patient's care significantly limited by  Social Determinants of Health including:    [] Inadequate housing    [] Low income    [] Alcoholism and drug addiction in family    [] Problems related to primary support group    [] Unemployment    [] Problems related to employment    [] Other Social Determinants of Health:     I considered prescription management with:    [] Pain medication:   [] Antiviral:   [x] Antibiotic: Doxycycline, Augmentin   [] Other:    Additional orders considered but not ordered:  The following testing was considered but ultimately not selected: N/A    ED Course:    ED Course as of 03/05/25 0612   Wed Mar 05, 2025   0610 Laboratory workup independently interpreted by myself demonstrates no substantial abnormalities [KB]   0610 Twelve-lead ECG independently interpreted by myself demonstrates a ventricularly paced rhythm, normal rate of 99, no ST segment elevation or depression. [KB]   0611 CTA of the chest independently interpreted by myself demonstrates bibasilar infiltrates [KB]      ED Course User Index  [KB] Quinn Sanchez MD         Diagnostic lab and imaging studies were conducted.  I reevaluated the  patient and we discussed her test results.  She is overall well-appearing on reassessment, normal vital signs, no tachypnea or other respiratory distress.  She is appropriate for outpatient treatment at this time.  Although bibasilar infiltrates on her CT scan certainly could be related to her COVID-19 it could also represent a superimposed bacterial pneumonia.  Patient was treated for community-acquired pneumonia with initial dose of Augmentin and doxycycline in the ER and an outpatient treatment course of the same.    Prior to discharge from the emergency department I discussed with the patient and any family members present the current diagnosis, treatment plan, recommendations regarding follow-up with a primary care doctor or specialist, general emergency room return precautions as well as return precautions specific to their diagnosis and treatment.  The patient/family indicated understanding of these instructions.  Time was allotted to answer questions at that time and throughout the ED course.         PROCEDURES:  Procedures    CRITICAL CARE TIME        CONSULTS       FINAL IMPRESSION      1. Pneumonia of both lower lobes due to infectious organism    2. COVID-19    3. Shortness of breath    4. Acute cough    5. Left upper lobe pulmonary nodule          DISPOSITION/PLAN     ED Disposition       ED Disposition   Discharge    Condition   Stable    Comment   --                 Comment: Please note this report has been produced using speech recognition software.      Quinn Sanchez MD  Attending Emergency Physician    Recent Results (from the past 24 hours)   ECG 12 Lead ED Triage Standing Order; SOA    Collection Time: 03/04/25  7:54 PM   Result Value Ref Range    QT Interval 392 ms    QTC Interval 503 ms   Lactic Acid, Plasma    Collection Time: 03/04/25  8:51 PM    Specimen: Arm, Left; Blood   Result Value Ref Range    Lactate 1.0 0.5 - 2.0 mmol/L   Comprehensive Metabolic Panel    Collection Time: 03/04/25   8:51 PM    Specimen: Arm, Left; Blood   Result Value Ref Range    Glucose 167 (H) 65 - 99 mg/dL    BUN 10 8 - 23 mg/dL    Creatinine 0.90 0.57 - 1.00 mg/dL    Sodium 135 (L) 136 - 145 mmol/L    Potassium 3.6 3.5 - 5.2 mmol/L    Chloride 97 (L) 98 - 107 mmol/L    CO2 19.0 (L) 22.0 - 29.0 mmol/L    Calcium 9.2 8.6 - 10.5 mg/dL    Total Protein 7.1 6.0 - 8.5 g/dL    Albumin 3.8 3.5 - 5.2 g/dL    ALT (SGPT) 20 1 - 33 U/L    AST (SGOT) 22 1 - 32 U/L    Alkaline Phosphatase 52 39 - 117 U/L    Total Bilirubin 0.5 0.0 - 1.2 mg/dL    Globulin 3.3 gm/dL    A/G Ratio 1.2 g/dL    BUN/Creatinine Ratio 11.1 7.0 - 25.0    Anion Gap 19.0 (H) 5.0 - 15.0 mmol/L    eGFR 70.2 >60.0 mL/min/1.73   BNP    Collection Time: 03/04/25  8:51 PM    Specimen: Arm, Left; Blood   Result Value Ref Range    proBNP 155.1 0.0 - 900.0 pg/mL   High Sensitivity Troponin T    Collection Time: 03/04/25  8:51 PM    Specimen: Arm, Left; Blood   Result Value Ref Range    HS Troponin T 8 <14 ng/L   Green Top (Gel)    Collection Time: 03/04/25  8:51 PM   Result Value Ref Range    Extra Tube Hold for add-ons.    Lavender Top    Collection Time: 03/04/25  8:51 PM   Result Value Ref Range    Extra Tube hold for add-on    Gold Top - SST    Collection Time: 03/04/25  8:51 PM   Result Value Ref Range    Extra Tube Hold for add-ons.    Gray Top    Collection Time: 03/04/25  8:51 PM   Result Value Ref Range    Extra Tube Hold for add-ons.    Light Blue Top    Collection Time: 03/04/25  8:51 PM   Result Value Ref Range    Extra Tube Hold for add-ons.    CBC Auto Differential    Collection Time: 03/04/25  8:51 PM    Specimen: Arm, Left; Blood   Result Value Ref Range    WBC 9.05 3.40 - 10.80 10*3/mm3    RBC 4.01 3.77 - 5.28 10*6/mm3    Hemoglobin 11.6 (L) 12.0 - 15.9 g/dL    Hematocrit 34.7 34.0 - 46.6 %    MCV 86.5 79.0 - 97.0 fL    MCH 28.9 26.6 - 33.0 pg    MCHC 33.4 31.5 - 35.7 g/dL    RDW 12.6 12.3 - 15.4 %    RDW-SD 39.9 37.0 - 54.0 fl    MPV 11.0 6.0 - 12.0 fL     Platelets 165 140 - 450 10*3/mm3    Neutrophil % 84.0 (H) 42.7 - 76.0 %    Lymphocyte % 9.3 (L) 19.6 - 45.3 %    Monocyte % 6.2 5.0 - 12.0 %    Eosinophil % 0.0 (L) 0.3 - 6.2 %    Basophil % 0.1 0.0 - 1.5 %    Immature Grans % 0.4 0.0 - 0.5 %    Neutrophils, Absolute 7.60 (H) 1.70 - 7.00 10*3/mm3    Lymphocytes, Absolute 0.84 0.70 - 3.10 10*3/mm3    Monocytes, Absolute 0.56 0.10 - 0.90 10*3/mm3    Eosinophils, Absolute 0.00 0.00 - 0.40 10*3/mm3    Basophils, Absolute 0.01 0.00 - 0.20 10*3/mm3    Immature Grans, Absolute 0.04 0.00 - 0.05 10*3/mm3    nRBC 0.0 0.0 - 0.2 /100 WBC   High Sensitivity Troponin T 1Hr    Collection Time: 03/04/25 11:11 PM    Specimen: Arm, Left; Blood   Result Value Ref Range    HS Troponin T 10 <14 ng/L    Troponin T Numeric Delta 2 Abnormal if >/=3 ng/L     Note: In addition to lab results from this visit, the labs listed above may include labs taken at another facility or during a different encounter within the last 24 hours. Please correlate lab times with ED admission and discharge times for further clarification of the services performed during this visit.                 Quinn Sanchez MD  03/05/25 [id number]

## 2025-03-08 ENCOUNTER — RESULTS FOLLOW-UP (OUTPATIENT)
Dept: EMERGENCY DEPT | Facility: HOSPITAL | Age: 70
End: 2025-03-08
Payer: COMMERCIAL

## 2025-03-08 NOTE — TELEPHONE ENCOUNTER
I spoke with the patient, notified of positive blood cultures only 1 set, most likely contaminant.  Patient reports that she still has congestion, productive cough, shortness of breath on exertion, and fatigue.  She told me she has 2 more days to finish antibiotics and will come back to ER if not better.

## 2025-03-10 LAB
BACTERIA SPEC AEROBE CULT: ABNORMAL
BACTERIA SPEC AEROBE CULT: NORMAL
GRAM STN SPEC: ABNORMAL
ISOLATED FROM: ABNORMAL

## 2025-03-11 ENCOUNTER — HOSPITAL ENCOUNTER (OUTPATIENT)
Dept: GENERAL RADIOLOGY | Facility: HOSPITAL | Age: 70
Discharge: HOME | End: 2025-03-11
Payer: COMMERCIAL

## 2025-03-11 ENCOUNTER — OFFICE VISIT (OUTPATIENT)
Dept: INTERNAL MEDICINE | Facility: CLINIC | Age: 70
End: 2025-03-11
Payer: COMMERCIAL

## 2025-03-11 ENCOUNTER — TELEPHONE (OUTPATIENT)
Dept: INTERNAL MEDICINE | Facility: CLINIC | Age: 70
End: 2025-03-11

## 2025-03-11 ENCOUNTER — LAB (OUTPATIENT)
Dept: LAB | Facility: HOSPITAL | Age: 70
End: 2025-03-11
Payer: COMMERCIAL

## 2025-03-11 VITALS
DIASTOLIC BLOOD PRESSURE: 76 MMHG | HEART RATE: 93 BPM | BODY MASS INDEX: 39.95 KG/M2 | OXYGEN SATURATION: 99 % | HEIGHT: 64 IN | SYSTOLIC BLOOD PRESSURE: 124 MMHG | WEIGHT: 234 LBS

## 2025-03-11 DIAGNOSIS — I10 ESSENTIAL HYPERTENSION: ICD-10-CM

## 2025-03-11 DIAGNOSIS — U07.1 PNEUMONIA DUE TO COVID-19 VIRUS: ICD-10-CM

## 2025-03-11 DIAGNOSIS — J12.82 PNEUMONIA DUE TO COVID-19 VIRUS: ICD-10-CM

## 2025-03-11 DIAGNOSIS — I50.22 CHRONIC SYSTOLIC (CONGESTIVE) HEART FAILURE: ICD-10-CM

## 2025-03-11 DIAGNOSIS — U07.1 PNEUMONIA DUE TO COVID-19 VIRUS: Primary | ICD-10-CM

## 2025-03-11 DIAGNOSIS — J12.82 PNEUMONIA DUE TO COVID-19 VIRUS: Primary | ICD-10-CM

## 2025-03-11 LAB
ALBUMIN SERPL-MCNC: 3.8 G/DL (ref 3.5–5.2)
ALBUMIN/GLOB SERPL: 1.2 G/DL
ALP SERPL-CCNC: 56 U/L (ref 39–117)
ALT SERPL W P-5'-P-CCNC: 23 U/L (ref 1–33)
ANION GAP SERPL CALCULATED.3IONS-SCNC: 14 MMOL/L (ref 5–15)
AST SERPL-CCNC: 18 U/L (ref 1–32)
BASOPHILS # BLD AUTO: 0.03 10*3/MM3 (ref 0–0.2)
BASOPHILS NFR BLD AUTO: 0.4 % (ref 0–1.5)
BILIRUB SERPL-MCNC: 0.3 MG/DL (ref 0–1.2)
BUN SERPL-MCNC: 6 MG/DL (ref 8–23)
BUN/CREAT SERPL: 8 (ref 7–25)
CALCIUM SPEC-SCNC: 9.5 MG/DL (ref 8.6–10.5)
CHLORIDE SERPL-SCNC: 104 MMOL/L (ref 98–107)
CO2 SERPL-SCNC: 24 MMOL/L (ref 22–29)
CREAT SERPL-MCNC: 0.75 MG/DL (ref 0.57–1)
DEPRECATED RDW RBC AUTO: 41.1 FL (ref 37–54)
EGFRCR SERPLBLD CKD-EPI 2021: 87.4 ML/MIN/1.73
EOSINOPHIL # BLD AUTO: 0.14 10*3/MM3 (ref 0–0.4)
EOSINOPHIL NFR BLD AUTO: 2 % (ref 0.3–6.2)
ERYTHROCYTE [DISTWIDTH] IN BLOOD BY AUTOMATED COUNT: 12.8 % (ref 12.3–15.4)
GLOBULIN UR ELPH-MCNC: 3.2 GM/DL
GLUCOSE SERPL-MCNC: 124 MG/DL (ref 65–99)
HCT VFR BLD AUTO: 37 % (ref 34–46.6)
HGB BLD-MCNC: 11.7 G/DL (ref 12–15.9)
IMM GRANULOCYTES # BLD AUTO: 0.04 10*3/MM3 (ref 0–0.05)
IMM GRANULOCYTES NFR BLD AUTO: 0.6 % (ref 0–0.5)
LYMPHOCYTES # BLD AUTO: 1.44 10*3/MM3 (ref 0.7–3.1)
LYMPHOCYTES NFR BLD AUTO: 20.7 % (ref 19.6–45.3)
MCH RBC QN AUTO: 28.4 PG (ref 26.6–33)
MCHC RBC AUTO-ENTMCNC: 31.6 G/DL (ref 31.5–35.7)
MCV RBC AUTO: 89.8 FL (ref 79–97)
MONOCYTES # BLD AUTO: 0.54 10*3/MM3 (ref 0.1–0.9)
MONOCYTES NFR BLD AUTO: 7.8 % (ref 5–12)
NEUTROPHILS NFR BLD AUTO: 4.75 10*3/MM3 (ref 1.7–7)
NEUTROPHILS NFR BLD AUTO: 68.5 % (ref 42.7–76)
NRBC BLD AUTO-RTO: 0 /100 WBC (ref 0–0.2)
PLATELET # BLD AUTO: 380 10*3/MM3 (ref 140–450)
PMV BLD AUTO: 10.8 FL (ref 6–12)
POTASSIUM SERPL-SCNC: 4.3 MMOL/L (ref 3.5–5.2)
PROCALCITONIN SERPL-MCNC: 0.04 NG/ML (ref 0–0.25)
PROT SERPL-MCNC: 7 G/DL (ref 6–8.5)
RBC # BLD AUTO: 4.12 10*6/MM3 (ref 3.77–5.28)
SODIUM SERPL-SCNC: 142 MMOL/L (ref 136–145)
WBC NRBC COR # BLD AUTO: 6.94 10*3/MM3 (ref 3.4–10.8)

## 2025-03-11 PROCEDURE — 80053 COMPREHEN METABOLIC PANEL: CPT

## 2025-03-11 PROCEDURE — 85025 COMPLETE CBC W/AUTO DIFF WBC: CPT

## 2025-03-11 PROCEDURE — 84145 PROCALCITONIN (PCT): CPT

## 2025-03-11 PROCEDURE — 36415 COLL VENOUS BLD VENIPUNCTURE: CPT

## 2025-03-11 PROCEDURE — 71046 X-RAY EXAM CHEST 2 VIEWS: CPT

## 2025-03-11 RX ORDER — CEFTRIAXONE 1 G/1
2 INJECTION, POWDER, FOR SOLUTION INTRAMUSCULAR; INTRAVENOUS ONCE
Status: COMPLETED | OUTPATIENT
Start: 2025-03-11 | End: 2025-03-11

## 2025-03-11 RX ORDER — SACCHAROMYCES BOULARDII 250 MG
250 CAPSULE ORAL 2 TIMES DAILY
Qty: 30 CAPSULE | Refills: 1 | Status: SHIPPED | OUTPATIENT
Start: 2025-03-11

## 2025-03-11 RX ADMIN — CEFTRIAXONE 2 G: 1 INJECTION, POWDER, FOR SOLUTION INTRAMUSCULAR; INTRAVENOUS at 09:32

## 2025-03-11 NOTE — PROGRESS NOTES
Office Note     Name: Vicky Ortiz    : 1955     MRN: [id number]     Chief Complaint  Pneumonia    Subjective     History of Present Illness:  Vicky Ortiz is a 69 y.o. female who presents today for pneumonia.    History of Present Illness  The patient presents for evaluation of bilateral pneumonia.    She was diagnosed with bilateral pneumonia on 2025 during an emergency room visit. She was discharged with prescriptions for doxycycline, Augmentin, and Tessalon Perles. She completed her antibiotic course on  and reports no significant improvement in her respiratory function.  She has not experienced any fevers or sweats since her hospital admission last Tuesday. She had some diarrhea, which she attributes to her dietary intake, with the most recent episode occurring yesterday. She had a small bowel movement today. She was not prescribed any steroids upon discharge from the hospital.    Supplemental Information  She has a scheduled appointment with cardiology on Thursday.    MEDICATIONS  Current: doxycycline, Augmentin, Tessalon Perles    Review of Systems:   Review of Systems   Constitutional:  Positive for unexpected weight loss.   Respiratory:  Positive for cough, chest tightness, shortness of breath and wheezing.    Cardiovascular:  Negative for chest pain, palpitations and leg swelling.   Allergic/Immunologic: Positive for environmental allergies.       Past Medical History:   Past Medical History:   Diagnosis Date    Allergic 1978    Arthritis     Asthma 1979    Candida glabrata infection 2024    Carpal tunnel syndrome of right wrist 2024    CHF (congestive heart failure)     COPD (chronic obstructive pulmonary disease)     Coronary artery disease     Diabetes mellitus     Diabetic retinopathy     Hyperlipidemia     Hypertension     Knee swelling     Left bundle branch block (LBBB)     followed by Dr. Gonzalez    Neuromuscular disorder      Obesity 1978    Osteoarthritis 09/15/2016    Right knee Per [facility]IC      Tachycardia 09/15/2016    Per [facility]         Past Surgical History:   Past Surgical History:   Procedure Laterality Date    CARDIAC CATHETERIZATION      COLONOSCOPY      COLONOSCOPY N/A 2025    Procedure: COLONOSCOPY;  Surgeon: Torri Burrell MD;  Location: [facility] ENDOSCOPY;  Service: Gastroenterology;  Laterality: N/A;    FRACTURE SURGERY  2018    PACEMAKER IMPLANTATION  2019    PILONIDAL CYSTECTOMY      TONSILLECTOMY  196    WRIST SURGERY  plate placed in broken arm throuhgh wrist       Family History:   Family History   Problem Relation Age of Onset    Breast cancer Mother         mid 50's    Cancer Mother         Breast    Miscarriages / Stillbirths Mother     Hypertension Mother     Hyperlipidemia Father     Hypertension Father     Alcohol abuse Brother     Hypertension Brother     Angina Brother     Hyperlipidemia Brother     Hypertension Brother     Diabetes Maternal Grandmother     Stroke Paternal Grandmother     Asthma Maternal Aunt     COPD Maternal Aunt     Early death Paternal Grandfather         early 50s    Alcohol abuse Maternal Uncle     Alcohol abuse Maternal Uncle     Early death Paternal Uncle         early 50s    Breast cancer Cousin     Ovarian cancer Cousin     Breast cancer Paternal Great-Grandmother        Social History:   Social History     Socioeconomic History    Marital status:    Tobacco Use    Smoking status: Former     Current packs/day: 0.00     Average packs/day: 0.8 packs/day for 37.2 years (27.9 ttl pk-yrs)     Types: Cigarettes     Start date: 7/15/1976     Quit date: 10/4/2013     Years since quittin.4     Passive exposure: Never    Smokeless tobacco: Never   Vaping Use    Vaping status: Never Used   Substance and Sexual Activity    Alcohol use: Never    Drug use: Never    Sexual activity: Not Currently     Partners: Male     Birth control/protection: None     Comment: Infertile        Immunizations:   Immunization History   Administered Date(s) Administered    Fluzone  >6mos 10/13/2010    Tdap 06/30/2021        Medications:     Current Outpatient Medications:     albuterol (ACCUNEB) 1.25 MG/3ML nebulizer solution, Take 3 mL by nebulization Every 6 (Six) Hours As Needed for Shortness of Air., Disp: 25 each, Rfl: 0    albuterol sulfate  (90 Base) MCG/ACT inhaler, Inhale 2 puffs Every 4 (Four) Hours As Needed for Wheezing., Disp: 18 g, Rfl: 3    ASPIRIN 81 PO, Take 1 tablet by mouth Daily., Disp: , Rfl:     carvedilol (COREG) 12.5 MG tablet, Take 1 tablet by mouth 2 (Two) Times a Day With Meals., Disp: 180 tablet, Rfl: 3    cholecalciferol (VITAMIN D3) 250 MCG (85661 UT) capsule, Take 1 capsule by mouth Daily., Disp: , Rfl:     Fluticasone-Umeclidin-Vilant (TRELEGY ELLIPTA) 200-62.5-25 MCG/ACT inhaler, Inhale 1 puff Daily., Disp: 60 each, Rfl: 1    lisinopril (PRINIVIL,ZESTRIL) 10 MG tablet, Take 1 tablet by mouth Daily., Disp: 90 tablet, Rfl: 1    metFORMIN (GLUCOPHAGE) 1000 MG tablet, Take 1 tablet by mouth 2 (Two) Times a Day With Meals., Disp: 60 tablet, Rfl: 5    metFORMIN (GLUCOPHAGE) 1000 MG tablet, Take 1 tablet by mouth 2 (Two) Times a Day With Meals., Disp: 60 tablet, Rfl: 5    nystatin (MYCOSTATIN) 934576 UNIT/GM powder, Apply  topically to the appropriate area as directed 3 (Three) Times a Day., Disp: 60 g, Rfl: 2    Ozempic, 0.25 or 0.5 MG/DOSE, 2 MG/3ML solution pen-injector, Inject 0.5 mg under the skin into the appropriate area as directed 1 (One) Time Per Week., Disp: 3 mL, Rfl: 2    pravastatin (PRAVACHOL) 40 MG tablet, Take 1 tablet by mouth Daily., Disp: 90 tablet, Rfl: 1    saccharomyces boulardii (Florastor) 250 MG capsule, Take 1 capsule by mouth 2 (Two) Times a Day., Disp: 30 capsule, Rfl: 1    Semaglutide,0.25 or 0.5MG/DOS, (Ozempic, 0.25 or 0.5 MG/DOSE,) 2 MG/3ML solution pen-injector, Inject 0.5 mg under the skin into the appropriate area as directed Every 7  "(Seven) Days., Disp: 9 mL, Rfl: 1    torsemide (DEMADEX) 20 MG tablet, Take 1 tablet by mouth Daily., Disp: 90 tablet, Rfl: 1    sodium-potassium-magnesium sulfates (Suprep Bowel Prep Kit) 17.5-3.13-1.6 GM/177ML solution oral solution, Take 1 bottle by mouth Take As Directed. (Patient not taking: Reported on 3/11/2025), Disp: 354 mL, Rfl: 0  No current facility-administered medications for this visit.    Allergies:   Allergies   Allergen Reactions    Atorvastatin Nausea And Vomiting and Unknown - Low Severity    Jardiance [Empagliflozin] Rash    Shellfish-Derived Products GI Bleeding and Rash       Objective     Vital Signs  /76 (BP Location: Left arm, Patient Position: Sitting, Cuff Size: Adult)   Pulse 93   Ht 162.6 cm (64.02\")   Wt 106 kg (234 lb)   SpO2 99%   BMI 40.15 kg/m²   Body mass index is 40.15 kg/m².     Class 3 Severe Obesity (BMI >=40). Obesity-related health conditions include the following: hypertension. Obesity is unchanged. BMI is is above average; BMI management plan is completed. We discussed low calorie, low carb based diet program, portion control, and increasing exercise.       Physical Exam  Vitals and nursing note reviewed.   Constitutional:       General: She is not in acute distress.     Appearance: She is well-developed. She is ill-appearing. She is not diaphoretic.   HENT:      Head: Normocephalic and atraumatic.      Nose: Nose normal.   Eyes:      Conjunctiva/sclera: Conjunctivae normal.      Pupils: Pupils are equal, round, and reactive to light.   Neck:      Vascular: No JVD.   Cardiovascular:      Rate and Rhythm: Normal rate and regular rhythm.      Heart sounds: Normal heart sounds. No murmur heard.  Pulmonary:      Effort: Pulmonary effort is normal. No respiratory distress.      Breath sounds: No stridor. Wheezing and rales present.   Musculoskeletal:      Cervical back: Neck supple.   Lymphadenopathy:      Cervical: No cervical adenopathy.          Procedures "     Results:  No results found for this or any previous visit (from the past 24 hours).     Assessment and Plan     Assessment/Plan:  Diagnoses and all orders for this visit:    1. Pneumonia due to COVID-19 virus (Primary)    2. Essential hypertension  Overview:  Takes lisinopril 10mg daily.       3. Chronic systolic (congestive) heart failure  Overview:  Pacemaker/defibrillator-carvedilol 12.5 BID.  Uses torsemide 20mg daily, but can increase if needed, per cardiology.        Chest x-ray, lab work under separate same-day order.  Rocephin 2 g given to patient in the room  Assessment & Plan  1. Bilateral pneumonia.  She was diagnosed with bilateral pneumonia and discharged with doxycycline, Augmentin, and Tessalon Perles. Her blood culture revealed an unusual bacterium, but the antibiotics prescribed should cover it. Her white blood cell count was not elevated, but she had an infection. . A repeat chest x-ray will be ordered to ensure the condition has not worsened. Additional lab work will be conducted. A Rocephin injection will be administered today. Probiotics will be prescribed to be taken twice daily. If insurance does not cover the prescribed probiotics, over-the-counter options should be considered. She is advised to maintain adequate hydration, engage in some physical activity, and practice breathing exercises.    Follow-up  The patient will follow up in 1 week.        Follow Up  Return in about 1 week (around 3/18/2025).    Patient or patient representative verbalized consent for the use of Ambient Listening during the visit with  Clemencia Robins PA-C for chart documentation. 3/11/2025  12:25 EDT      Clemencia Robins PA-C   [facility] [place] [address]

## 2025-03-13 ENCOUNTER — OFFICE VISIT (OUTPATIENT)
Dept: CARDIOLOGY | Facility: CLINIC | Age: 70
End: 2025-03-13
Payer: COMMERCIAL

## 2025-03-13 VITALS
OXYGEN SATURATION: 95 % | HEART RATE: 80 BPM | BODY MASS INDEX: 40.08 KG/M2 | HEIGHT: 64 IN | DIASTOLIC BLOOD PRESSURE: 72 MMHG | WEIGHT: 234.8 LBS | SYSTOLIC BLOOD PRESSURE: 118 MMHG

## 2025-03-13 DIAGNOSIS — E78.2 MIXED HYPERLIPIDEMIA: ICD-10-CM

## 2025-03-13 DIAGNOSIS — I10 ESSENTIAL HYPERTENSION: ICD-10-CM

## 2025-03-13 DIAGNOSIS — I50.22 CHRONIC SYSTOLIC (CONGESTIVE) HEART FAILURE: Primary | ICD-10-CM

## 2025-03-13 NOTE — PROGRESS NOTES
[facility]  [address], [address]  [place], [place], [address]    [phone]  [email or web address] HospitalRSVP LawUniversity [facility] [place] [facility]           OUTPATIENT CLINIC FOLLOW UP NOTE    Patient care team:  Patient Care Team:  Clemencia Robins PA-C as PCP - General (Physician Assistant)  Jesus Gonzalez MD as Consulting Physician (Cardiology)  He Lee DO as Consulting Physician (Pulmonary Disease)      Subjective:   Chief complaint:   Chief Complaint   Patient presents with    Chronic systolic (congestive) heart failure         Vicky Ortiz is a 69 y.o. female.  Cardiac focused problem list:  Cardiomyopathy  Echocardiogram 8/13/2014:  LVEF 25-30%. Mid anteroseptal, mid anterior, mid inferoseptal, apical septal and apical anterior wall segments are akinetic. Mild to moderate MR.   [facility], 9/08/2014:   Mild, nonobstructive CAD.   Echocardiogram 12/04/2014:  LVEF 40%. Normal valves.   Echocardiogram 9/2016:  LVEF 45-50%.   Echocardiogram 2/15/2018:  LVEF 25%. Mild MR. Mild TR. Diastolic dysfunction. RVSP 25-30 mmHg.   C 2/15/2018:  Normal coronaries. Distal LAD bridging.   Echocardiogram 5/2018:  LVEF 40%.   Status post [employer]tr[employer] Bi-V ICD, 6/10/2019, in [place].   OS Echocardiogram 3/09/2021:  LVEF 45-50%.   OSH Echocardiogram 4/15/2024:  LVEF 60%. Mild MR. Mild TR.RVSP 20-25 mmHg.   Left bundle branch block.   Diagnosed by Lyndon Catalan, approximately  2007.  Stress test (12/14/2005):  Negative for ischemia, mildly hypokinetic LVEF.   Cardiac catheterization at [facility] in [place], data deficit. No intervention.   Palpitations.   Hypertension.    Type 2 diabetes mellitus.   Hyperlipidemia.   Reactive airway disease.   Carpal tunnel.   Tobacco abuse.   Surgical history.    Pilonidal cyst removal.   Tonsillectomy.   Throat surgery    HPI:    Patient presents today for follow up. Patient reports recent COVID infection in February followed by pneumonia.  Recent ED visit and treated  with antibiotics. Prior to COVID infection, patient reports an increased in edema, shortness of breath and increased weight by about 10 pounds.  Weight baseline.  She continues to recover from COVID and pneumonia.  Continues to have some shortness of breath is worse and mild edema.  Taking extra torsemide about once a week.  Has had persistent cough since COVID diagnosis with some associated chest pressure.    Review of Systems:  As noted above in the HPI    PFSH:  Patient Active Problem List   Diagnosis    Diabetes mellitus    Hyperlipidemia    Chronic systolic (congestive) heart failure    Essential hypertension    Trigger middle finger of right hand    Screening for osteoporosis    Mild intermittent asthma without complication    Personal history of smoking    Thyroid nodule    Carpal tunnel syndrome of right wrist    Left upper lobe 11 mm noncalcified nodule (2022)    Mucopurulent chronic bronchitis    Routine medical exam    Screen for colon cancer    Exposure to blood    Pneumonia due to COVID-19 virus         Current Outpatient Medications:     albuterol (ACCUNEB) 1.25 MG/3ML nebulizer solution, Take 3 mL by nebulization Every 6 (Six) Hours As Needed for Shortness of Air., Disp: 25 each, Rfl: 0    albuterol sulfate  (90 Base) MCG/ACT inhaler, Inhale 2 puffs Every 4 (Four) Hours As Needed for Wheezing., Disp: 18 g, Rfl: 3    ASPIRIN 81 PO, Take 1 tablet by mouth Daily., Disp: , Rfl:     carvedilol (COREG) 12.5 MG tablet, Take 1 tablet by mouth 2 (Two) Times a Day With Meals., Disp: 180 tablet, Rfl: 3    cholecalciferol (VITAMIN D3) 250 MCG (38690 UT) capsule, Take 1 capsule by mouth Daily., Disp: , Rfl:     Fluticasone-Umeclidin-Vilant (TRELEGY ELLIPTA) 200-62.5-25 MCG/ACT inhaler, Inhale 1 puff Daily., Disp: 60 each, Rfl: 1    lisinopril (PRINIVIL,ZESTRIL) 10 MG tablet, Take 1 tablet by mouth Daily., Disp: 90 tablet, Rfl: 1    metFORMIN (GLUCOPHAGE) 1000 MG tablet, Take 1 tablet by mouth 2 (Two) Times  a Day With Meals., Disp: 60 tablet, Rfl: 5    nystatin (MYCOSTATIN) 417038 UNIT/GM powder, Apply  topically to the appropriate area as directed 3 (Three) Times a Day., Disp: 60 g, Rfl: 2    Ozempic, 0.25 or 0.5 MG/DOSE, 2 MG/3ML solution pen-injector, Inject 0.5 mg under the skin into the appropriate area as directed 1 (One) Time Per Week., Disp: 3 mL, Rfl: 2    pravastatin (PRAVACHOL) 40 MG tablet, Take 1 tablet by mouth Daily., Disp: 90 tablet, Rfl: 1    saccharomyces boulardii (Florastor) 250 MG capsule, Take 1 capsule by mouth 2 (Two) Times a Day., Disp: 30 capsule, Rfl: 1    Semaglutide,0.25 or 0.5MG/DOS, (Ozempic, 0.25 or 0.5 MG/DOSE,) 2 MG/3ML solution pen-injector, Inject 0.5 mg under the skin into the appropriate area as directed Every 7 (Seven) Days., Disp: 9 mL, Rfl: 1    sodium-potassium-magnesium sulfates (Suprep Bowel Prep Kit) 17.5-3.13-1.6 GM/177ML solution oral solution, Take 1 bottle by mouth Take As Directed., Disp: 354 mL, Rfl: 0    torsemide (DEMADEX) 20 MG tablet, Take 1 tablet by mouth Daily., Disp: 90 tablet, Rfl: 1    Allergies   Allergen Reactions    Atorvastatin Nausea And Vomiting and Unknown - Low Severity    Jardiance [Empagliflozin] Rash    Shellfish-Derived Products GI Bleeding and Rash       Social History     Socioeconomic History    Marital status:    Tobacco Use    Smoking status: Former     Current packs/day: 0.00     Average packs/day: 0.8 packs/day for 37.2 years (27.9 ttl pk-yrs)     Types: Cigarettes     Start date: 7/15/1976     Quit date: 10/4/2013     Years since quittin.4     Passive exposure: Never    Smokeless tobacco: Never   Vaping Use    Vaping status: Never Used   Substance and Sexual Activity    Alcohol use: Never    Drug use: Never    Sexual activity: Not Currently     Partners: Male     Birth control/protection: None     Comment: Infertile     Family History   Problem Relation Age of Onset    Breast cancer Mother         mid 50's    Cancer Mother       "   Breast    Miscarriages / Stillbirths Mother     Hypertension Mother     Hyperlipidemia Father     Hypertension Father     Alcohol abuse Brother     Hypertension Brother     Angina Brother     Hyperlipidemia Brother     Hypertension Brother     Diabetes Maternal Grandmother     Stroke Paternal Grandmother     Asthma Maternal Aunt     COPD Maternal Aunt     Early death Paternal Grandfather         early 50s    Alcohol abuse Maternal Uncle     Alcohol abuse Maternal Uncle     Early death Paternal Uncle         early 50s    Breast cancer Cousin     Ovarian cancer Cousin     Breast cancer Paternal Great-Grandmother          Objective:   Physical Exam:  /72 (BP Location: Right arm, Patient Position: Sitting, Cuff Size: Adult)   Pulse 80   Ht 162.6 cm (64.02\")   Wt 107 kg (234 lb 12.8 oz)   SpO2 95%   BMI 40.28 kg/m²    CONSTITUTIONAL: No acute distress  RESPIRATORY: Normal effort. Wheezing and rhonchi bilaterally   CARDIOVASCULAR: Regular rate and rhythm with normal S1 and S2. Without murmur.  PERIPHERAL VASCULAR: No carotid bruit bilaterally.  Normal radial pulse. There is no significant lower extremity edema bilaterally.      Labs:  Labs reviewed by myself    Lab Results   Component Value Date    CHOL 148 07/29/2024     Lab Results   Component Value Date    TRIG 81 07/29/2024     Lab Results   Component Value Date    HDL 50 07/29/2024     Lab Results   Component Value Date    LDL 82 07/29/2024     No components found for: \"LDLDIRECTC\"    Diagnostic Data:    Procedures    DEVICE INTERROGATION:  MDT, Interrogation date 9/9/24- RA pacing 0%, BiV pacing 99%. Threshold and impedances are acceptable. Battery voltage is 1.9 years.    DEVICE INTERROGATION:  Medtronic, Interrogation date 3/13/2025- RA pacing 0%, RV pacing 98%. Threshold and impedances are acceptable. Battery voltage is 2 years.        Assessment and Plan:     Chronic systolic heart failure  Nonischemic cardiomyopathy  Essential hypertension  Mixed " hyperlipidemia   Obesity  -Repeat echocardiogram next month which will be one year since last echo.    -Continue current cardiac medications  -Continue daily weights.  Ok to continue extra dose of torsedmide as needed.    -Continue lifestyle/risk factor modification  -Could not afford co-pays on home/remote monitoring.  Will do in office monitoring at least until she is closer to [place].  Stable device interrogation in clinic today with battery life at 2 years.     - Return in about 6 months (around 9/13/2025) for Next scheduled follow up Dr. Gonzalez, EKG .    Electronically signed by MARK Mullins, 03/13/25, 11:54 AM EDT.

## 2025-03-18 ENCOUNTER — OFFICE VISIT (OUTPATIENT)
Dept: INTERNAL MEDICINE | Facility: CLINIC | Age: 70
End: 2025-03-18
Payer: COMMERCIAL

## 2025-03-18 ENCOUNTER — HOSPITAL ENCOUNTER (OUTPATIENT)
Dept: GENERAL RADIOLOGY | Facility: HOSPITAL | Age: 70
Discharge: HOME | End: 2025-03-18
Admitting: PHYSICIAN ASSISTANT
Payer: COMMERCIAL

## 2025-03-18 VITALS
BODY MASS INDEX: 40.12 KG/M2 | TEMPERATURE: 98 F | OXYGEN SATURATION: 96 % | HEART RATE: 70 BPM | HEIGHT: 64 IN | WEIGHT: 235 LBS | DIASTOLIC BLOOD PRESSURE: 60 MMHG | SYSTOLIC BLOOD PRESSURE: 110 MMHG

## 2025-03-18 DIAGNOSIS — U07.1 PNEUMONIA DUE TO COVID-19 VIRUS: ICD-10-CM

## 2025-03-18 DIAGNOSIS — J12.82 PNEUMONIA DUE TO COVID-19 VIRUS: ICD-10-CM

## 2025-03-18 DIAGNOSIS — J12.82 PNEUMONIA DUE TO COVID-19 VIRUS: Primary | ICD-10-CM

## 2025-03-18 DIAGNOSIS — U07.1 PNEUMONIA DUE TO COVID-19 VIRUS: Primary | ICD-10-CM

## 2025-03-18 PROCEDURE — 71046 X-RAY EXAM CHEST 2 VIEWS: CPT

## 2025-03-18 PROCEDURE — 99214 OFFICE O/P EST MOD 30 MIN: CPT | Performed by: PHYSICIAN ASSISTANT

## 2025-03-18 NOTE — PROGRESS NOTES
Office Note     Name: Vicky Ortiz    : 1955     MRN: [id number]     Chief Complaint  Pneumonia (F/u)    Subjective     History of Present Illness:  Vicky Ortiz is a 69 y.o. female who presents today for pneumonia follow-up.    History of Present Illness  The patient presents for evaluation of pneumonia. She is accompanied by her .    She reports a significant improvement in her condition, estimating a 75 percent recovery compared to the previous week. She has been able to discontinue the use of her walker for short distances and engage in light household activities such as sweeping and loading or unloading the . However, she continues to experience fatigue and shortness of breath, although with increased resilience. She also reports persistent wheezing and a tickling sensation in her lungs. Her sleep pattern has been stable for the past 4 to 5 days, with no coughing episodes at night. She experienced mild diarrhea following the Rocephin injection, but this was managed with probiotics and a balanced diet.    MEDICATIONS  Current: prednisone, Rocephin, probiotic    Review of Systems:   Review of Systems   Constitutional:  Positive for fatigue. Negative for chills, diaphoresis and fever.   HENT:  Positive for congestion and sore throat. Negative for swollen glands.    Respiratory:  Positive for cough and shortness of breath.    Cardiovascular:  Negative for chest pain.   Gastrointestinal:  Negative for abdominal pain, nausea and vomiting.   Genitourinary:  Negative for dysuria.   Musculoskeletal:  Positive for myalgias. Negative for neck pain.   Skin:  Positive for rash.   Neurological:  Positive for weakness. Negative for numbness.       Past Medical History:   Past Medical History:   Diagnosis Date    Allergic 1978    Arthritis 2015    Asthma 1979    Candida glabrata infection 2024    Carpal tunnel syndrome of right wrist 2024    CHF (congestive heart failure)  2013    COPD (chronic obstructive pulmonary disease) 2014    Coronary artery disease 2013    Diabetes mellitus 2015    Diabetic retinopathy     Hyperlipidemia 1997    Hypertension 1997    Knee swelling     Left bundle branch block (LBBB)     followed by Dr. Gonzalez    Lung nodule     Neuromuscular disorder 2004    Obesity 1978    Osteoarthritis 09/15/2016    Right knee Per EPIC      Pneumonia 2016/2017    Hospitalized    Tachycardia 09/15/2016    Per [facility]         Past Surgical History:   Past Surgical History:   Procedure Laterality Date    CARDIAC CATHETERIZATION  2018    CARDIAC SURGERY  2019    COLONOSCOPY  2013    COLONOSCOPY N/A 02/06/2025    Procedure: COLONOSCOPY;  Surgeon: Torri Burrell MD;  Location: [facility] ENDOSCOPY;  Service: Gastroenterology;  Laterality: N/A;    FRACTURE SURGERY  2018    INSERT / REPLACE / REMOVE PACEMAKER  6/2019    PACEMAKER IMPLANTATION  2019    PILONIDAL CYSTECTOMY      TONSILLECTOMY  1961    WRIST SURGERY  plate placed in broken arm throuhgh wrist       Family History:   Family History   Problem Relation Age of Onset    Breast cancer Mother         mid 50's    Cancer Mother         Breast    Miscarriages / Stillbirths Mother     Hypertension Mother     Hyperlipidemia Father     Hypertension Father     Alcohol abuse Brother     Hypertension Brother     Angina Brother     Hyperlipidemia Brother     Hypertension Brother     Diabetes Maternal Grandmother     Stroke Paternal Grandmother     Asthma Maternal Aunt     COPD Maternal Aunt     Early death Paternal Grandfather         early 50s    Alcohol abuse Maternal Uncle     Alcohol abuse Maternal Uncle     Early death Paternal Uncle         early 50s    Breast cancer Cousin     Ovarian cancer Cousin     Breast cancer Paternal Great-Grandmother        Social History:   Social History     Socioeconomic History    Marital status:    Tobacco Use    Smoking status: Former     Current packs/day: 0.00     Average packs/day: 0.8 packs/day  for 39.5 years (29.8 ttl pk-yrs)     Types: Cigarettes     Start date: 7/15/1976     Quit date: 10/4/2013     Years since quittin.4     Passive exposure: Never    Smokeless tobacco: Never   Vaping Use    Vaping status: Never Used   Substance and Sexual Activity    Alcohol use: Never    Drug use: Never    Sexual activity: Not Currently     Partners: Male     Birth control/protection: None     Comment: Infertile       Immunizations:   Immunization History   Administered Date(s) Administered    Fluzone  >6mos 10/13/2010    Tdap 2021        Medications:     Current Outpatient Medications:     albuterol (ACCUNEB) 1.25 MG/3ML nebulizer solution, Take 3 mL by nebulization Every 6 (Six) Hours As Needed for Shortness of Air., Disp: 25 each, Rfl: 0    albuterol sulfate  (90 Base) MCG/ACT inhaler, Inhale 2 puffs Every 4 (Four) Hours As Needed for Wheezing., Disp: 18 g, Rfl: 3    ASPIRIN 81 PO, Take 1 tablet by mouth Daily., Disp: , Rfl:     carvedilol (COREG) 12.5 MG tablet, Take 1 tablet by mouth 2 (Two) Times a Day With Meals., Disp: 180 tablet, Rfl: 3    cholecalciferol (VITAMIN D3) 250 MCG (68437 UT) capsule, Take 1 capsule by mouth Daily., Disp: , Rfl:     Fluticasone-Umeclidin-Vilant (TRELEGY ELLIPTA) 200-62.5-25 MCG/ACT inhaler, Inhale 1 puff Daily., Disp: 60 each, Rfl: 1    lisinopril (PRINIVIL,ZESTRIL) 10 MG tablet, Take 1 tablet by mouth Daily., Disp: 90 tablet, Rfl: 1    metFORMIN (GLUCOPHAGE) 1000 MG tablet, Take 1 tablet by mouth 2 (Two) Times a Day With Meals., Disp: 60 tablet, Rfl: 5    nystatin (MYCOSTATIN) 115580 UNIT/GM powder, Apply  topically to the appropriate area as directed 3 (Three) Times a Day., Disp: 60 g, Rfl: 2    Ozempic, 0.25 or 0.5 MG/DOSE, 2 MG/3ML solution pen-injector, Inject 0.5 mg under the skin into the appropriate area as directed 1 (One) Time Per Week., Disp: 3 mL, Rfl: 2    pravastatin (PRAVACHOL) 40 MG tablet, Take 1 tablet by mouth Daily., Disp: 90 tablet, Rfl: 1     "saccharomyces boulardii (Florastor) 250 MG capsule, Take 1 capsule by mouth 2 (Two) Times a Day., Disp: 30 capsule, Rfl: 1    Semaglutide,0.25 or 0.5MG/DOS, (Ozempic, 0.25 or 0.5 MG/DOSE,) 2 MG/3ML solution pen-injector, Inject 0.5 mg under the skin into the appropriate area as directed Every 7 (Seven) Days., Disp: 9 mL, Rfl: 1    torsemide (DEMADEX) 20 MG tablet, Take 1 tablet by mouth Daily., Disp: 90 tablet, Rfl: 1    sodium-potassium-magnesium sulfates (Suprep Bowel Prep Kit) 17.5-3.13-1.6 GM/177ML solution oral solution, Take 1 bottle by mouth Take As Directed. (Patient not taking: Reported on 3/18/2025), Disp: 354 mL, Rfl: 0    Allergies:   Allergies   Allergen Reactions    Atorvastatin Nausea And Vomiting and Unknown - Low Severity    Jardiance [Empagliflozin] Rash    Shellfish-Derived Products GI Bleeding and Rash       Objective     Vital Signs  /60 (BP Location: Left arm, Patient Position: Sitting, Cuff Size: Adult)   Pulse 70   Temp 98 °F (36.7 °C) (Infrared)   Ht 162.6 cm (64.02\")   Wt 107 kg (235 lb)   SpO2 96%   BMI 40.32 kg/m²   Body mass index is 40.32 kg/m².     Class 3 Severe Obesity (BMI >=40). Obesity-related health conditions include the following: diabetes mellitus. Obesity is unchanged. BMI is is above average; BMI management plan is completed. We discussed low calorie, low carb based diet program, portion control, and increasing exercise.       Physical Exam  Vitals reviewed.   Constitutional:       Appearance: Normal appearance. She is well-developed.   HENT:      Head: Normocephalic and atraumatic.      Right Ear: Hearing, tympanic membrane, ear canal and external ear normal.      Left Ear: Hearing, tympanic membrane, ear canal and external ear normal.      Nose: Nose normal.      Mouth/Throat:      Pharynx: Uvula midline.   Eyes:      General: Lids are normal.      Conjunctiva/sclera: Conjunctivae normal.      Pupils: Pupils are equal, round, and reactive to light. "   Cardiovascular:      Rate and Rhythm: Normal rate and regular rhythm.      Heart sounds: Normal heart sounds.   Pulmonary:      Effort: Pulmonary effort is normal.      Breath sounds: Normal breath sounds.   Abdominal:      General: Bowel sounds are normal.      Palpations: Abdomen is soft.   Musculoskeletal:         General: Normal range of motion.      Cervical back: Full passive range of motion without pain, normal range of motion and neck supple.   Skin:     General: Skin is warm and dry.   Neurological:      Mental Status: She is alert and oriented to person, place, and time.      Deep Tendon Reflexes: Reflexes are normal and symmetric.   Psychiatric:         Speech: Speech normal.         Behavior: Behavior normal.         Thought Content: Thought content normal.         Judgment: Judgment normal.        Procedures     Results:  No results found for this or any previous visit (from the past 24 hours).     Assessment and Plan     Assessment/Plan:  Diagnoses and all orders for this visit:    1. Pneumonia due to COVID-19 virus (Primary)  -     XR Chest PA & Lateral; Future        Assessment & Plan  1. Pneumonia.  Her condition has shown significant improvement, with a 75% increase in health status compared to the previous week. The chest x-ray from 03/11/2025, when compared to the one from 03/04/2025, revealed persistent right-sided pneumonia. Lab results were reassuring, and the blood test for sepsis was normal. She received a Rocephin 2 g shot, which has contributed to her improvement. She continues to experience fatigue and shortness of breath but is more resilient and can perform daily activities without a walker. A repeat chest x-ray will be conducted today to monitor the resolution of the pneumonia. She is advised to take Mucinex (guaifenesin) with a large glass of water to thin mucus and aid in its expulsion. No additional medications will be prescribed at this time.      Follow Up  Return if symptoms  worsen or fail to improve, for NEXT SCHEDULED FOLLOW UP.    Patient or patient representative verbalized consent for the use of Ambient Listening during the visit with  Clemencia Robins PA-C for chart documentation. 3/18/2025  12:45 EDT      Clemencia Robins PA-C   [facility] [place] [id number]  Answers submitted by the patient for this visit:  Problem not listed (Submitted on 3/17/2025)  Chief Complaint: Other medical problem  Reason for appointment: Follow up  anorexia: No  joint pain: No  change in stool: No  headaches: Yes  joint swelling: No  vertigo: No  visual change: No

## 2025-03-18 NOTE — PATIENT INSTRUCTIONS
"BMI for Adults  Body mass index (BMI) is a number found using a person's weight and height. BMI can help tell how much of a person's weight is made up of fat. BMI does not measure body fat directly. It is used instead of tests that directly measure body fat, which can be difficult and expensive.  What are BMI measurements used for?  BMI is useful to:  Find out if your weight puts you at higher risk for medical problems.  Help recommend changes, such as in diet and exercise. This can help you reach a healthy weight. BMI screening can be done again to see if these changes are working.  How is BMI calculated?  Your height and weight are measured. The BMI is found from those numbers. This can be done with U[place] or metric measurements. Note that charts and online BMI calculators are available to help you find your BMI quickly and easily without doing these calculations.  To calculate your BMI in U.S[place] measurements:  Measure your weight in pounds (lb).  Multiply the number of pounds by 703.  So, for an adult who weighs 150 lb, multiply that number by 703: 150 x 703, which equals 105,450.  Measure your height in inches. Then multiply that number by itself to get a measurement called \"inches squared.\"  So, for an adult who is 70 inches tall, the \"inches squared\" measurement is 70 inches x 70 inches, which equals 4,900 inches squared.  Divide the total from step 2 (number of lb x 703) by the total from step 3 (inches squared): 105,450 ÷ 4,900 = 21.5. This is your BMI.  To calculate your BMI in metric measurements:    Measure your weight in kilograms (kg).  For this example, the weight is 70 kg.  Measure your height in meters (m). Then multiply that number by itself to get a measurement called \"meters squared.\"  So, for an adult who is 1.75 m tall, the \"meters squared\" measurement is 1.75 m x 1.75 m, which equals 3.1 meters squared.  Divide the number of kilograms (your weight) by the meters squared number. In this example: 70 " ÷ 3.1 = 22.6. This is your BMI.  What do the results mean?  BMI charts are used to see if you are underweight, normal weight, overweight, or obese. The following guidelines will be used:  Underweight: BMI less than 18.5.  Normal weight: BMI between 18.5 and 24.9.  Overweight: BMI between 25 and 29.9.  Obese: BMI of 30 or above.  BMI is a tool and cannot diagnose a condition. Talk with your health care provider about what your BMI means for you. Keep these notes in mind:  Weight includes fat and muscle. Someone with a muscular build, such as an athlete, may have a BMI that is higher than 24.9. In cases like these, BMI is not a correct measure of body fat.  If you have a BMI of 25 or higher, your provider may need to do more testing to find out if excess body fat is the cause.  BMI is measured the same way for males and females. Females usually have more body fat than males of the same height and weight.  Where to find more information  For more information about BMI, including tools to quickly find your BMI, go to:  Centers for Disease Control and Prevention: cdc.gov  American Heart Association: heart.org  National Heart, Lung, and Blood [place]: nhlbi.nih.gov  This information is not intended to replace advice given to you by your health care provider. Make sure you discuss any questions you have with your health care provider.  Document Revised: 09/07/2023 Document Reviewed: 08/31/2023  [employer] Patient Education © 2024 [employer]Advance Directive    Advance directives are legal papers that state your wishes about health care decisions. They let your wishes be known to family, friends, and health care providers if you become unable to speak for yourself.   You should write these papers out over time rather than all at once. They can be changed and updated at any time.  The types of advance directives include:  Medical power of  (POA).  Living silveira.  Do not resuscitate (DNR) or do not attempt  resuscitation (DNAR) orders.  What are a health care proxy and medical POA?  A health care proxy is also called a health care agent. It's a person you choose to make medical decisions for you when you can't make them for yourself. In most cases, a proxy is a trusted friend or family member.  A medical POA is legal paperwork that names your proxy. It may need to be:  Signed.  Notarized.  Dated.  Copied.  Witnessed.  Added to your medical record.  You may also want to choose someone to handle your money if you can't do so. This is called a durable POA for finances. It's separate from a medical POA. You may choose your health care proxy or someone else to act as your agent in money matters.  If you don't have a proxy, or if the proxy may not be acting in your best interest, a court may choose a guardian to act on your behalf.  What is a living will?  A living will is legal paperwork that states your wishes about medical care. Providers should keep a copy of it in your medical record. You may want to give a copy to family members or friends. You can also keep a card in your wallet to let loved ones know you have a living will and where they can find it. A living will may be used if:  You're very sick with something that will end your life.  You become disabled.  You can't make decisions or speak for yourself.  Your living will should include whether:  To use or not use life support equipment. This may include machines to filter your blood or to help you breathe.  You want a DNR or DNAR order. This tells providers not to use CPR if your heart or breathing stops.  To use or not use tube feeding.  You want to be given foods and fluids.  You want a type of comfort care called palliative care. This may be given when the goal for treatment becomes comfort rather than a cure.  You want to donate your organs and tissues.  A living will doesn't say what to do with your money and property if you pass away.  What is a DNR or  DNAR?  A DNR or DNAR order is a request not to have CPR. If you don't have one of these orders, a provider will try to help you if your heart stops or you stop breathing.   If you plan to have surgery, talk with your provider about your DNR or DNAR order.  What happens if I don't have an advance directive?  Each state has its own laws about advance directives. Some states assign family decision makers to act on your behalf if you don't have an advance directive.   Check with your provider, , or state representative about the laws in your state.  Where to find more information  Each state has its own laws about advance directives. You can look up these laws at: [facility].org  This information is not intended to replace advice given to you by your health care provider. Make sure you discuss any questions you have with your health care provider.  Document Revised: 05/06/2024 Document Reviewed: 05/06/2024  [employer] Patient Education © 2024 [employer]

## 2025-04-15 ENCOUNTER — OFFICE VISIT (OUTPATIENT)
Dept: INTERNAL MEDICINE | Facility: CLINIC | Age: 70
End: 2025-04-15
Payer: COMMERCIAL

## 2025-04-15 ENCOUNTER — TELEPHONE (OUTPATIENT)
Dept: INTERNAL MEDICINE | Facility: CLINIC | Age: 70
End: 2025-04-15

## 2025-04-15 VITALS
HEART RATE: 72 BPM | OXYGEN SATURATION: 100 % | DIASTOLIC BLOOD PRESSURE: 84 MMHG | BODY MASS INDEX: 39.85 KG/M2 | SYSTOLIC BLOOD PRESSURE: 122 MMHG | HEIGHT: 64 IN | WEIGHT: 233.4 LBS | TEMPERATURE: 98.6 F

## 2025-04-15 DIAGNOSIS — E11.9 TYPE 2 DIABETES MELLITUS WITHOUT COMPLICATION, WITHOUT LONG-TERM CURRENT USE OF INSULIN: Primary | ICD-10-CM

## 2025-04-15 DIAGNOSIS — E78.2 MIXED HYPERLIPIDEMIA: ICD-10-CM

## 2025-04-15 DIAGNOSIS — E04.2 MULTIPLE THYROID NODULES: ICD-10-CM

## 2025-04-15 DIAGNOSIS — I10 ESSENTIAL HYPERTENSION: ICD-10-CM

## 2025-04-15 DIAGNOSIS — Z13.0 SCREENING FOR DEFICIENCY ANEMIA: ICD-10-CM

## 2025-04-15 DIAGNOSIS — Z13.21 ENCOUNTER FOR VITAMIN DEFICIENCY SCREENING: ICD-10-CM

## 2025-04-15 NOTE — TELEPHONE ENCOUNTER
Advised patient via my chart that  I have received a prior authorization request from the pharmacy for the medication MOUNJARO 2.5 mg pen injector. I submitted the request to the insurance company and as soon as I have a response I will let her know as well.

## 2025-04-15 NOTE — PROGRESS NOTES
Answers submitted by the patient for this visit:  Questionnaire about: Other medical problem (Submitted on 4/15/2025)  Chief Complaint: Other medical problem      Office Note     Name: Vicky Ortiz    : 1955     MRN: [id number]     Chief Complaint  Diabetes    Subjective     History of Present Illness:  Vicky Ortiz is a 69 y.o. female who presents today for 3 month follow up.  PMH significant for type 2 diabetes, hyperlipidemia, hypertension, obesity, and recent pneumonia.    History of Present Illness  The patient presents for evaluation of diabetes mellitus, weight management, and diverticulosis.    She reports an improvement in her condition, with no current symptoms of coughing or congestion. Blood glucose levels have been monitored at home, consistently around 150. Currently, she is on Ozempic 0.5 mg, which has effectively managed her blood glucose levels. Weight loss of approximately 8 to 9 pounds is noted, attributed to a decreased appetite during a recent illness. The lowest recorded weight in the past decade was 229 pounds, and the current weight is 233 pounds. Concerns about potential skin sagging due to weight loss are expressed, along with experiencing rashes, for which a prescribed powder and cream are being used.    A probiotic regimen has been maintained, significantly improving bowel regularity. A colonoscopy was performed earlier this year, revealing multiple small mouth diverticula, with a recommendation to repeat the procedure in five years.    Additionally, she mentions that her platelet count, hemoglobin level, and white blood cell count were all low prior to her COVID-19 infection.    Review of Systems:   Review of Systems   Constitutional:  Negative for activity change, appetite change, diaphoresis, fatigue, unexpected weight gain and unexpected weight loss.   HENT:  Negative for hearing loss.    Eyes:  Negative for visual disturbance.   Respiratory:  Negative for chest  tightness and shortness of breath.    Cardiovascular:  Negative for chest pain, palpitations and leg swelling.   Gastrointestinal:  Negative for abdominal pain, blood in stool, GERD and indigestion.   Endocrine: Negative for cold intolerance and heat intolerance.   Genitourinary:  Negative for dysuria and hematuria.   Musculoskeletal:  Negative for arthralgias and myalgias.   Skin:  Negative for skin lesions.   Neurological:  Negative for tremors, seizures, syncope, speech difficulty, weakness, headache, memory problem and confusion.   Hematological:  Does not bruise/bleed easily.   Psychiatric/Behavioral:  Negative for sleep disturbance and depressed mood. The patient is not nervous/anxious.        Past Medical History:   Past Medical History:   Diagnosis Date    Allergic 1978    Arthritis 2015    Asthma 1979    Candida glabrata infection 07/29/2024    Carpal tunnel syndrome of right wrist 08/05/2024    CHF (congestive heart failure) 2013    COPD (chronic obstructive pulmonary disease) 2014    Coronary artery disease 2013    Diabetes mellitus 2015    Diabetic retinopathy     Hyperlipidemia 1997    Hypertension 1997    Knee swelling     Left bundle branch block (LBBB)     followed by Dr. Gonzalez    Lung nodule     Neuromuscular disorder 2004    Obesity 1978    Osteoarthritis 09/15/2016    Right knee Per EPIC      Pneumonia 2016/2017    Hospitalized    Tachycardia 09/15/2016    Per [facility]         Past Surgical History:   Past Surgical History:   Procedure Laterality Date    CARDIAC CATHETERIZATION  2018    CARDIAC SURGERY  2019    COLONOSCOPY  2013    COLONOSCOPY N/A 02/06/2025    Procedure: COLONOSCOPY;  Surgeon: Torri Burrell MD;  Location: [facility] ENDOSCOPY;  Service: Gastroenterology;  Laterality: N/A;    FRACTURE SURGERY  2018    INSERT / REPLACE / REMOVE PACEMAKER  6/2019    PACEMAKER IMPLANTATION  2019    PILONIDAL CYSTECTOMY      TONSILLECTOMY  1961    WRIST SURGERY  plate placed in broken arm [facility]        Family History:   Family History   Problem Relation Age of Onset    Breast cancer Mother         mid 50's    Cancer Mother         Breast    Miscarriages / Stillbirths Mother     Hypertension Mother     Hyperlipidemia Father     Hypertension Father     Alcohol abuse Brother     Hypertension Brother     Angina Brother     Hyperlipidemia Brother     Hypertension Brother     Diabetes Maternal Grandmother     Stroke Paternal Grandmother     Asthma Maternal Aunt     COPD Maternal Aunt     Early death Paternal Grandfather         early 50s    Alcohol abuse Maternal Uncle     Alcohol abuse Maternal Uncle     Early death Paternal Uncle         early 50s    Breast cancer Cousin     Ovarian cancer Cousin     Breast cancer Paternal Great-Grandmother        Social History:   Social History     Socioeconomic History    Marital status:    Tobacco Use    Smoking status: Former     Current packs/day: 0.00     Average packs/day: 0.8 packs/day for 39.5 years (29.8 ttl pk-yrs)     Types: Cigarettes     Start date: 7/15/1976     Quit date: 10/4/2013     Years since quittin.5     Passive exposure: Never    Smokeless tobacco: Never   Vaping Use    Vaping status: Never Used   Substance and Sexual Activity    Alcohol use: Never    Drug use: Never    Sexual activity: Not Currently     Partners: Male     Birth control/protection: None     Comment: Infertile       Immunizations:   Immunization History   Administered Date(s) Administered    Fluzone  >6mos 10/13/2010    Tdap 2021        Medications:     Current Outpatient Medications:     albuterol (ACCUNEB) 1.25 MG/3ML nebulizer solution, Take 3 mL by nebulization Every 6 (Six) Hours As Needed for Shortness of Air., Disp: 25 each, Rfl: 0    albuterol sulfate  (90 Base) MCG/ACT inhaler, Inhale 2 puffs Every 4 (Four) Hours As Needed for Wheezing., Disp: 18 g, Rfl: 3    ASPIRIN 81 PO, Take 1 tablet by mouth Daily., Disp: , Rfl:     carvedilol (COREG) 12.5 MG tablet,  "Take 1 tablet by mouth 2 (Two) Times a Day With Meals., Disp: 180 tablet, Rfl: 3    cholecalciferol (VITAMIN D3) 250 MCG (85242 UT) capsule, Take 1 capsule by mouth Daily., Disp: , Rfl:     Fluticasone-Umeclidin-Vilant (TRELEGY ELLIPTA) 200-62.5-25 MCG/ACT inhaler, Inhale 1 puff Daily., Disp: 60 each, Rfl: 1    lisinopril (PRINIVIL,ZESTRIL) 10 MG tablet, Take 1 tablet by mouth Daily., Disp: 90 tablet, Rfl: 1    metFORMIN (GLUCOPHAGE) 1000 MG tablet, Take 1 tablet by mouth 2 (Two) Times a Day With Meals., Disp: 60 tablet, Rfl: 5    nystatin (MYCOSTATIN) 689154 UNIT/GM powder, Apply  topically to the appropriate area as directed 3 (Three) Times a Day., Disp: 60 g, Rfl: 2    pravastatin (PRAVACHOL) 40 MG tablet, Take 1 tablet by mouth Daily., Disp: 90 tablet, Rfl: 1    saccharomyces boulardii (Florastor) 250 MG capsule, Take 1 capsule by mouth 2 (Two) Times a Day., Disp: 30 capsule, Rfl: 1    torsemide (DEMADEX) 20 MG tablet, Take 1 tablet by mouth Daily., Disp: 90 tablet, Rfl: 1    Tirzepatide 2.5 MG/0.5ML solution auto-injector, Inject 2.5 mg under the skin into the appropriate area as directed 1 (One) Time Per Week., Disp: 2 mL, Rfl: 0    Allergies:   Allergies   Allergen Reactions    Atorvastatin Nausea And Vomiting and Unknown - Low Severity    Jardiance [Empagliflozin] Rash    Shellfish-Derived Products GI Bleeding and Rash       Objective     Vital Signs  /84 (BP Location: Left arm, Patient Position: Sitting, Cuff Size: Adult)   Pulse 72   Temp 98.6 °F (37 °C) (Infrared)   Ht 162.6 cm (64\")   Wt 106 kg (233 lb 6.4 oz)   SpO2 100%   BMI 40.06 kg/m²   Body mass index is 40.06 kg/m².     Class 3 Severe Obesity (BMI >=40). Obesity-related health conditions include the following: hypertension. Obesity is improving with treatment. BMI is is above average; BMI management plan is completed. We discussed low calorie, low carb based diet program, portion control, and increasing exercise.       Physical " Exam  Vitals reviewed.   Constitutional:       Appearance: Normal appearance. She is well-developed. She is obese.   HENT:      Head: Normocephalic and atraumatic.      Right Ear: Hearing, tympanic membrane, ear canal and external ear normal.      Left Ear: Hearing, tympanic membrane, ear canal and external ear normal.      Nose: Nose normal.      Mouth/Throat:      Pharynx: Uvula midline.   Eyes:      General: Lids are normal.      Conjunctiva/sclera: Conjunctivae normal.      Pupils: Pupils are equal, round, and reactive to light.   Cardiovascular:      Rate and Rhythm: Normal rate and regular rhythm.      Heart sounds: Normal heart sounds.   Pulmonary:      Effort: Pulmonary effort is normal.      Breath sounds: Normal breath sounds.   Abdominal:      General: Bowel sounds are normal.      Palpations: Abdomen is soft.      Tenderness: There is no abdominal tenderness.   Musculoskeletal:         General: Normal range of motion.      Cervical back: Full passive range of motion without pain, normal range of motion and neck supple.   Skin:     General: Skin is warm and dry.   Neurological:      Mental Status: She is alert and oriented to person, place, and time.      Deep Tendon Reflexes: Reflexes are normal and symmetric.   Psychiatric:         Speech: Speech normal.         Behavior: Behavior normal.         Thought Content: Thought content normal.         Judgment: Judgment normal.        Procedures     Results:  No results found for this or any previous visit (from the past 24 hours).     Assessment and Plan     Assessment/Plan:  Diagnoses and all orders for this visit:    1. Type 2 diabetes mellitus without complication, without long-term current use of insulin (Primary)  Overview:  Diagnosed 2004.     Orders:  -     Tirzepatide 2.5 MG/0.5ML solution auto-injector; Inject 2.5 mg under the skin into the appropriate area as directed 1 (One) Time Per Week.  Dispense: 2 mL; Refill: 0  -     CBC & Differential;  Future  -     Comprehensive Metabolic Panel; Future  -     Hemoglobin A1c; Future    2. Mixed hyperlipidemia  Overview:  Continue pravastatin 40mg nightly.    Orders:  -     Lipid Panel; Future    3. Essential hypertension  Overview:  Takes lisinopril 10mg daily.     Orders:  -     Microalbumin / Creatinine Urine Ratio - Urine, Clean Catch; Future  -     Urinalysis, Microscopic Only - Urine, Clean Catch; Future    4. Multiple thyroid nodules  -     TSH; Future  -     T4, Free; Future  -     T3, Free; Future    5. Encounter for vitamin deficiency screening  -     Vitamin B12; Future  -     Vitamin D,25-Hydroxy; Future    6. Screening for deficiency anemia  -     Iron Profile; Future  -     Ferritin; Future        Assessment & Plan  1. Diabetes Mellitus.  - Blood glucose levels have been elevated, likely due to a recent infection.  - Transition from Ozempic 0.5 mg to the lowest dose of Mounjaro 2.5 mg for one month, followed by an increase in dosage for the subsequent two months.  - Laboratory tests, including A1c, blood counts, cholesterol, and kidney function, will be conducted in three months. An iron profile and ferritin test will also be included.  - Advised to maintain a healthy diet, ensure adequate protein intake, and hydrate frequently throughout the day. Report any side effects from Mounjaro immediately.    2. Weight Management.  - Approximately 20 pounds lost with Ozempic.  - Will transition to Mounjaro due to current side effects and allow further weight loss.  - Advised to continue monitoring weight and report any significant changes.  - If experiencing any rashes or skin issues, these should be documented and reported.    3. Diverticulosis.  - Advised to consume a high-fiber diet to prevent complications associated with diverticulosis.  - Repeat colonoscopy recommended in five years.    4. Low platelet count, hemoglobin level, and white blood cell count.  - Platelet count, hemoglobin level, and white  blood cell count were all low prior to COVID-19 infection.  - Monitoring these levels with upcoming laboratory tests in three months.       Follow Up  Return in about 3 months (around 7/15/2025).    Patient or patient representative verbalized consent for the use of Ambient Listening during the visit with  Clemencia Robins PA-C for chart documentation. 4/16/2025  08:16 EDT      Clemencia Robins PA-C   [facility] [place] [address]

## 2025-04-16 ENCOUNTER — TELEPHONE (OUTPATIENT)
Dept: INTERNAL MEDICINE | Facility: CLINIC | Age: 70
End: 2025-04-16
Payer: COMMERCIAL

## 2025-04-16 NOTE — PATIENT INSTRUCTIONS
"BMI for Adults  Body mass index (BMI) is a number found using a person's weight and height. BMI can help tell how much of a person's weight is made up of fat. BMI does not measure body fat directly. It is used instead of tests that directly measure body fat, which can be difficult and expensive.  What are BMI measurements used for?  BMI is useful to:  Find out if your weight puts you at higher risk for medical problems.  Help recommend changes, such as in diet and exercise. This can help you reach a healthy weight. BMI screening can be done again to see if these changes are working.  How is BMI calculated?  Your height and weight are measured. The BMI is found from those numbers. This can be done with U.S[place] or metric measurements. Note that charts and online BMI calculators are available to help you find your BMI quickly and easily without doing these calculations.  To calculate your BMI in U.S. measurements:  Measure your weight in pounds (lb).  Multiply the number of pounds by 703.  So, for an adult who weighs 150 lb, multiply that number by 703: 150 x 703, which equals 105,450.  Measure your height in inches. Then multiply that number by itself to get a measurement called \"inches squared.\"  So, for an adult who is 70 inches tall, the \"inches squared\" measurement is 70 inches x 70 inches, which equals 4,900 inches squared.  Divide the total from step 2 (number of lb x 703) by the total from step 3 (inches squared): 105,450 ÷ 4,900 = 21.5. This is your BMI.  To calculate your BMI in metric measurements:    Measure your weight in kilograms (kg).  For this example, the weight is 70 kg.  Measure your height in meters (m). Then multiply that number by itself to get a measurement called \"meters squared.\"  So, for an adult who is 1.75 m tall, the \"meters squared\" measurement is 1.75 m x 1.75 m, which equals 3.1 meters squared.  Divide the number of kilograms (your weight) by the meters squared number. In this example: 70 " ÷ 3.1 = 22.6. This is your BMI.  What do the results mean?  BMI charts are used to see if you are underweight, normal weight, overweight, or obese. The following guidelines will be used:  Underweight: BMI less than 18.5.  Normal weight: BMI between 18.5 and 24.9.  Overweight: BMI between 25 and 29.9.  Obese: BMI of 30 or above.  BMI is a tool and cannot diagnose a condition. Talk with your health care provider about what your BMI means for you. Keep these notes in mind:  Weight includes fat and muscle. Someone with a muscular build, such as an athlete, may have a BMI that is higher than 24.9. In cases like these, BMI is not a correct measure of body fat.  If you have a BMI of 25 or higher, your provider may need to do more testing to find out if excess body fat is the cause.  BMI is measured the same way for males and females. Females usually have more body fat than males of the same height and weight.  Where to find more information  For more information about BMI, including tools to quickly find your BMI, go to:  Centers for Disease Control and Prevention: cdc.gov  American Heart Association: heart.org  National Heart, Lung, and Blood [place]: nhlbi.nih.gov  This information is not intended to replace advice given to you by your health care provider. Make sure you discuss any questions you have with your health care provider.  Document Revised: 09/07/2023 Document Reviewed: 08/31/2023  [employer] Patient Education © 2024 [employer]Advance Directive    Advance directives are legal papers that state your wishes about health care decisions. They let your wishes be known to family, friends, and health care providers if you become unable to speak for yourself.   You should write these papers out over time rather than all at once. They can be changed and updated at any time.  The types of advance directives include:  Medical power of  (POA).  Living silveira.  Do not resuscitate (DNR) or do not attempt  resuscitation (DNAR) orders.  What are a health care proxy and medical POA?  A health care proxy is also called a health care agent. It's a person you choose to make medical decisions for you when you can't make them for yourself. In most cases, a proxy is a trusted friend or family member.  A medical POA is legal paperwork that names your proxy. It may need to be:  Signed.  Notarized.  Dated.  Copied.  Witnessed.  Added to your medical record.  You may also want to choose someone to handle your money if you can't do so. This is called a durable POA for finances. It's separate from a medical POA. You may choose your health care proxy or someone else to act as your agent in money matters.  If you don't have a proxy, or if the proxy may not be acting in your best interest, a court may choose a guardian to act on your behalf.  What is a living will?  A living will is legal paperwork that states your wishes about medical care. Providers should keep a copy of it in your medical record. You may want to give a copy to family members or friends. You can also keep a card in your wallet to let loved ones know you have a living will and where they can find it. A living will may be used if:  You're very sick with something that will end your life.  You become disabled.  You can't make decisions or speak for yourself.  Your living will should include whether:  To use or not use life support equipment. This may include machines to filter your blood or to help you breathe.  You want a DNR or DNAR order. This tells providers not to use CPR if your heart or breathing stops.  To use or not use tube feeding.  You want to be given foods and fluids.  You want a type of comfort care called palliative care. This may be given when the goal for treatment becomes comfort rather than a cure.  You want to donate your organs and tissues.  A living will doesn't say what to do with your money and property if you pass away.  What is a DNR or  DNAR?  A DNR or DNAR order is a request not to have CPR. If you don't have one of these orders, a provider will try to help you if your heart stops or you stop breathing.   If you plan to have surgery, talk with your provider about your DNR or DNAR order.  What happens if I don't have an advance directive?  Each state has its own laws about advance directives. Some states assign family decision makers to act on your behalf if you don't have an advance directive.   Check with your provider, , or state representative about the laws in your state.  Where to find more information  Each state has its own laws about advance directives. You can look up these laws at: [facility].org  This information is not intended to replace advice given to you by your health care provider. Make sure you discuss any questions you have with your health care provider.  Document Revised: 05/06/2024 Document Reviewed: 05/06/2024  [employer] Patient Education © 2024 [employer]

## 2025-04-16 NOTE — TELEPHONE ENCOUNTER
Advised patient via my chart that I have received an approval letter from the insurance company for the medication MOUNJARO  2.5 mg pen injector. The approval dates are from 03/17/25 to 04/16/26.   A copy of this letter will be scanned into the chart .

## 2025-05-08 ENCOUNTER — HOSPITAL ENCOUNTER (OUTPATIENT)
Dept: CARDIOLOGY | Facility: HOSPITAL | Age: 70
Discharge: HOME | End: 2025-05-08
Admitting: HOSPITALIST
Payer: COMMERCIAL

## 2025-05-08 DIAGNOSIS — I50.22 CHRONIC SYSTOLIC (CONGESTIVE) HEART FAILURE: ICD-10-CM

## 2025-05-08 LAB
AORTIC DIMENSIONLESS INDEX: 0.61 (DI)
ASCENDING AORTA: 3 CM
AV MEAN PRESS GRAD SYS DOP V1V2: 3.5 MMHG
AV VMAX SYS DOP: 124.6 CM/SEC
BH CV ECHO MEAS - AO MAX PG: 6.2 MMHG
BH CV ECHO MEAS - AO ROOT DIAM: 3.3 CM
BH CV ECHO MEAS - AO V2 VTI: 24.6 CM
BH CV ECHO MEAS - AVA(I,D): 1.73 CM2
BH CV ECHO MEAS - EDV(MOD-SP2): 127.7 ML
BH CV ECHO MEAS - EF(MOD-SP2): 43.6 %
BH CV ECHO MEAS - EF(MOD-SP4): 41.3 %
BH CV ECHO MEAS - ESV(MOD-SP2): 72 ML
BH CV ECHO MEAS - IVS/LVPW: 0.87 CM
BH CV ECHO MEAS - IVSD: 1.3 CM
BH CV ECHO MEAS - LA DIMENSION: 3.5 CM
BH CV ECHO MEAS - LAT PEAK E' VEL: 7.1 CM/SEC
BH CV ECHO MEAS - LV MAX PG: 2.25 MMHG
BH CV ECHO MEAS - LV MEAN PG: 1.03 MMHG
BH CV ECHO MEAS - LV V1 MAX: 74.9 CM/SEC
BH CV ECHO MEAS - LV V1 VTI: 15.1 CM
BH CV ECHO MEAS - LVIDD: 3.7 CM
BH CV ECHO MEAS - LVIDS: 2.5 CM
BH CV ECHO MEAS - LVOT AREA: 2.8 CM2
BH CV ECHO MEAS - LVOT DIAM: 1.9 CM
BH CV ECHO MEAS - LVPWD: 1.5 CM
BH CV ECHO MEAS - MED PEAK E' VEL: 6.1 CM/SEC
BH CV ECHO MEAS - MV A MAX VEL: 81 CM/SEC
BH CV ECHO MEAS - MV DEC SLOPE: 302.3 CM/SEC2
BH CV ECHO MEAS - MV E MAX VEL: 39 CM/SEC
BH CV ECHO MEAS - MV E/A: 0.48
BH CV ECHO MEAS - MV MAX PG: 3.2 MMHG
BH CV ECHO MEAS - MV MEAN PG: 1.3 MMHG
BH CV ECHO MEAS - MV P1/2T: 67 MSEC
BH CV ECHO MEAS - MV V2 VTI: 22.2 CM
BH CV ECHO MEAS - MVA(P1/2T): 3.3 CM2
BH CV ECHO MEAS - MVA(VTI): 1.92 CM2
BH CV ECHO MEAS - PA ACC TIME: 0.13 SEC
BH CV ECHO MEAS - RAP SYSTOLE: 8 MMHG
BH CV ECHO MEAS - RVSP: 22 MMHG
BH CV ECHO MEAS - SV(LVOT): 42.7 ML
BH CV ECHO MEAS - SV(MOD-SP2): 55.7 ML
BH CV ECHO MEAS - SVI(LVOT): 20.4 ML/M2
BH CV ECHO MEAS - SVI(MOD-SP2): 26.6 ML/M2
BH CV ECHO MEAS - TAPSE (>1.6): 1.44 CM
BH CV ECHO MEAS - TR MAX PG: 14.1 MMHG
BH CV ECHO MEAS - TR MAX VEL: 187.5 CM/SEC
BH CV ECHO MEASUREMENTS AVERAGE E/E' RATIO: 5.91
BH CV XLRA - RV BASE: 3.9 CM
BH CV XLRA - RV LENGTH: 7 CM
BH CV XLRA - RV MID: 3 CM
BH CV XLRA - TDI S': 12.6 CM/SEC
IVRT: 100 MS

## 2025-05-08 PROCEDURE — 93306 TTE W/DOPPLER COMPLETE: CPT

## 2025-05-09 ENCOUNTER — PATIENT MESSAGE (OUTPATIENT)
Dept: INTERNAL MEDICINE | Facility: CLINIC | Age: 70
End: 2025-05-09
Payer: COMMERCIAL

## 2025-05-09 DIAGNOSIS — J45.20 MILD INTERMITTENT ASTHMA WITHOUT COMPLICATION: ICD-10-CM

## 2025-05-12 NOTE — TELEPHONE ENCOUNTER
Rx Refill Note  Requested Prescriptions     Pending Prescriptions Disp Refills    metFORMIN (GLUCOPHAGE) 1000 MG tablet 60 tablet 5     Sig: Take 1 tablet by mouth 2 (Two) Times a Day With Meals.      Last office visit with prescribing clinician: 4/15/2025   Last telemedicine visit with prescribing clinician: Visit date not found   Next office visit with prescribing clinician: 7/15/2025                         Would you like a call back once the refill request has been completed: [] Yes [] No    If the office needs to give you a call back, can they leave a voicemail: [] Yes [] No    Torri Bello LPN  05/12/25, 08:56 EDT

## 2025-06-03 DIAGNOSIS — I10 ESSENTIAL HYPERTENSION: ICD-10-CM

## 2025-06-03 DIAGNOSIS — E11.9 TYPE 2 DIABETES MELLITUS WITHOUT COMPLICATION, WITHOUT LONG-TERM CURRENT USE OF INSULIN: Primary | ICD-10-CM

## 2025-06-03 RX ORDER — LISINOPRIL 10 MG/1
10 TABLET ORAL DAILY
Qty: 90 TABLET | Refills: 1 | Status: SHIPPED | OUTPATIENT
Start: 2025-06-03

## 2025-07-11 ENCOUNTER — LAB (OUTPATIENT)
Dept: LAB | Facility: HOSPITAL | Age: 70
End: 2025-07-11
Payer: COMMERCIAL

## 2025-07-11 DIAGNOSIS — E78.2 MIXED HYPERLIPIDEMIA: ICD-10-CM

## 2025-07-11 DIAGNOSIS — E04.2 MULTIPLE THYROID NODULES: ICD-10-CM

## 2025-07-11 DIAGNOSIS — Z13.21 ENCOUNTER FOR VITAMIN DEFICIENCY SCREENING: ICD-10-CM

## 2025-07-11 DIAGNOSIS — Z13.0 SCREENING FOR DEFICIENCY ANEMIA: ICD-10-CM

## 2025-07-11 DIAGNOSIS — I10 ESSENTIAL HYPERTENSION: ICD-10-CM

## 2025-07-11 DIAGNOSIS — E11.9 TYPE 2 DIABETES MELLITUS WITHOUT COMPLICATION, WITHOUT LONG-TERM CURRENT USE OF INSULIN: ICD-10-CM

## 2025-07-11 LAB
25(OH)D3 SERPL-MCNC: 47.4 NG/ML (ref 30–100)
ALBUMIN SERPL-MCNC: 4.4 G/DL (ref 3.5–5.2)
ALBUMIN/GLOB SERPL: 1.6 G/DL
ALP SERPL-CCNC: 53 U/L (ref 39–117)
ALT SERPL W P-5'-P-CCNC: 12 U/L (ref 1–33)
ANION GAP SERPL CALCULATED.3IONS-SCNC: 13 MMOL/L (ref 5–15)
AST SERPL-CCNC: 19 U/L (ref 1–32)
BACTERIA UR QL AUTO: ABNORMAL /HPF
BASOPHILS # BLD AUTO: 0.04 10*3/MM3 (ref 0–0.2)
BASOPHILS NFR BLD AUTO: 0.7 % (ref 0–1.5)
BILIRUB SERPL-MCNC: 0.4 MG/DL (ref 0–1.2)
BUN SERPL-MCNC: 13 MG/DL (ref 8–23)
BUN/CREAT SERPL: 14.4 (ref 7–25)
CALCIUM SPEC-SCNC: 9.5 MG/DL (ref 8.6–10.5)
CHLORIDE SERPL-SCNC: 104 MMOL/L (ref 98–107)
CHOLEST SERPL-MCNC: 167 MG/DL (ref 0–200)
CO2 SERPL-SCNC: 24 MMOL/L (ref 22–29)
CREAT SERPL-MCNC: 0.9 MG/DL (ref 0.57–1)
DEPRECATED RDW RBC AUTO: 42.9 FL (ref 37–54)
EGFRCR SERPLBLD CKD-EPI 2021: 70.2 ML/MIN/1.73
EOSINOPHIL # BLD AUTO: 0.24 10*3/MM3 (ref 0–0.4)
EOSINOPHIL NFR BLD AUTO: 4.1 % (ref 0.3–6.2)
ERYTHROCYTE [DISTWIDTH] IN BLOOD BY AUTOMATED COUNT: 13.1 % (ref 12.3–15.4)
FERRITIN SERPL-MCNC: 55.8 NG/ML (ref 13–150)
GLOBULIN UR ELPH-MCNC: 2.7 GM/DL
GLUCOSE SERPL-MCNC: 97 MG/DL (ref 65–99)
HCT VFR BLD AUTO: 39.2 % (ref 34–46.6)
HDLC SERPL-MCNC: 46 MG/DL (ref 40–60)
HGB BLD-MCNC: 12.6 G/DL (ref 12–15.9)
HYALINE CASTS UR QL AUTO: ABNORMAL /LPF
IMM GRANULOCYTES # BLD AUTO: 0.01 10*3/MM3 (ref 0–0.05)
IMM GRANULOCYTES NFR BLD AUTO: 0.2 % (ref 0–0.5)
IRON 24H UR-MRATE: 94 MCG/DL (ref 37–145)
IRON SATN MFR SERPL: 21 % (ref 20–50)
LDLC SERPL CALC-MCNC: 100 MG/DL (ref 0–100)
LDLC/HDLC SERPL: 2.13 {RATIO}
LYMPHOCYTES # BLD AUTO: 1.59 10*3/MM3 (ref 0.7–3.1)
LYMPHOCYTES NFR BLD AUTO: 27 % (ref 19.6–45.3)
MCH RBC QN AUTO: 29.2 PG (ref 26.6–33)
MCHC RBC AUTO-ENTMCNC: 32.1 G/DL (ref 31.5–35.7)
MCV RBC AUTO: 90.7 FL (ref 79–97)
MONOCYTES # BLD AUTO: 0.38 10*3/MM3 (ref 0.1–0.9)
MONOCYTES NFR BLD AUTO: 6.5 % (ref 5–12)
NEUTROPHILS NFR BLD AUTO: 3.62 10*3/MM3 (ref 1.7–7)
NEUTROPHILS NFR BLD AUTO: 61.5 % (ref 42.7–76)
NRBC BLD AUTO-RTO: 0 /100 WBC (ref 0–0.2)
PLATELET # BLD AUTO: 180 10*3/MM3 (ref 140–450)
PMV BLD AUTO: 11.7 FL (ref 6–12)
POTASSIUM SERPL-SCNC: 4.1 MMOL/L (ref 3.5–5.2)
PROT SERPL-MCNC: 7.1 G/DL (ref 6–8.5)
RBC # BLD AUTO: 4.32 10*6/MM3 (ref 3.77–5.28)
RBC # UR STRIP: ABNORMAL /HPF
REF LAB TEST METHOD: ABNORMAL
SODIUM SERPL-SCNC: 141 MMOL/L (ref 136–145)
SQUAMOUS #/AREA URNS HPF: ABNORMAL /HPF
T3FREE SERPL-MCNC: 2.52 PG/ML (ref 2–4.4)
T4 FREE SERPL-MCNC: 1.06 NG/DL (ref 0.92–1.68)
TIBC SERPL-MCNC: 454 MCG/DL (ref 298–536)
TRANSFERRIN SERPL-MCNC: 305 MG/DL (ref 200–360)
TRIGL SERPL-MCNC: 115 MG/DL (ref 0–150)
TSH SERPL DL<=0.05 MIU/L-ACNC: 1.94 UIU/ML (ref 0.27–4.2)
VIT B12 BLD-MCNC: 344 PG/ML (ref 211–946)
VLDLC SERPL-MCNC: 21 MG/DL (ref 5–40)
WBC # UR STRIP: ABNORMAL /HPF
WBC NRBC COR # BLD AUTO: 5.88 10*3/MM3 (ref 3.4–10.8)

## 2025-07-11 PROCEDURE — 83540 ASSAY OF IRON: CPT

## 2025-07-11 PROCEDURE — 84481 FREE ASSAY (FT-3): CPT

## 2025-07-11 PROCEDURE — 83036 HEMOGLOBIN GLYCOSYLATED A1C: CPT

## 2025-07-11 PROCEDURE — 80061 LIPID PANEL: CPT

## 2025-07-11 PROCEDURE — 80050 GENERAL HEALTH PANEL: CPT

## 2025-07-11 PROCEDURE — 82728 ASSAY OF FERRITIN: CPT

## 2025-07-11 PROCEDURE — 84466 ASSAY OF TRANSFERRIN: CPT

## 2025-07-11 PROCEDURE — 82306 VITAMIN D 25 HYDROXY: CPT

## 2025-07-11 PROCEDURE — 82570 ASSAY OF URINE CREATININE: CPT

## 2025-07-11 PROCEDURE — 84439 ASSAY OF FREE THYROXINE: CPT

## 2025-07-11 PROCEDURE — 82607 VITAMIN B-12: CPT

## 2025-07-11 PROCEDURE — 81015 MICROSCOPIC EXAM OF URINE: CPT

## 2025-07-11 PROCEDURE — 82043 UR ALBUMIN QUANTITATIVE: CPT

## 2025-07-11 PROCEDURE — 36415 COLL VENOUS BLD VENIPUNCTURE: CPT

## 2025-07-12 LAB
ALBUMIN UR-MCNC: <1.2 MG/DL
CREAT UR-MCNC: 144.5 MG/DL
HBA1C MFR BLD: 6.1 % (ref 4.8–5.6)
MICROALBUMIN/CREAT UR: NORMAL MG/G{CREAT}

## 2025-07-14 ENCOUNTER — RESULTS FOLLOW-UP (OUTPATIENT)
Dept: INTERNAL MEDICINE | Facility: CLINIC | Age: 70
End: 2025-07-14
Payer: COMMERCIAL

## 2025-07-15 ENCOUNTER — TELEPHONE (OUTPATIENT)
Dept: INTERNAL MEDICINE | Facility: CLINIC | Age: 70
End: 2025-07-15

## 2025-07-15 ENCOUNTER — OFFICE VISIT (OUTPATIENT)
Dept: INTERNAL MEDICINE | Facility: CLINIC | Age: 70
End: 2025-07-15
Payer: COMMERCIAL

## 2025-07-15 VITALS
WEIGHT: 229 LBS | DIASTOLIC BLOOD PRESSURE: 64 MMHG | BODY MASS INDEX: 39.09 KG/M2 | TEMPERATURE: 97.9 F | HEART RATE: 72 BPM | HEIGHT: 64 IN | OXYGEN SATURATION: 97 % | SYSTOLIC BLOOD PRESSURE: 120 MMHG

## 2025-07-15 DIAGNOSIS — I10 ESSENTIAL HYPERTENSION: ICD-10-CM

## 2025-07-15 DIAGNOSIS — R29.6 FALLS FREQUENTLY: ICD-10-CM

## 2025-07-15 DIAGNOSIS — R29.898 RIGHT LEG WEAKNESS: ICD-10-CM

## 2025-07-15 DIAGNOSIS — E11.9 TYPE 2 DIABETES MELLITUS WITHOUT COMPLICATION, WITHOUT LONG-TERM CURRENT USE OF INSULIN: Primary | ICD-10-CM

## 2025-07-15 PROCEDURE — 99214 OFFICE O/P EST MOD 30 MIN: CPT | Performed by: PHYSICIAN ASSISTANT

## 2025-07-15 NOTE — TELEPHONE ENCOUNTER
Caller: [facility] [id number] - [address] - [phone]  - [phone]    Relationship to patient: Pharmacy    Patient is needing: ZEPBOUND. PATIENT HAS BEEN ON MOUNJARO, NEEDS TO KNOW IF THIS SWITCH WAS INTENTIONAL, INSURANCE WILL NOT COVER ZEPBOUND.
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
## 2025-07-28 DIAGNOSIS — I50.22 CHRONIC SYSTOLIC (CONGESTIVE) HEART FAILURE: ICD-10-CM

## 2025-07-28 RX ORDER — TORSEMIDE 20 MG/1
20 TABLET ORAL DAILY
Qty: 90 TABLET | Refills: 1 | Status: SHIPPED | OUTPATIENT
Start: 2025-07-28

## 2025-07-30 ENCOUNTER — TREATMENT (OUTPATIENT)
Dept: PHYSICAL THERAPY | Facility: CLINIC | Age: 70
End: 2025-07-30
Payer: COMMERCIAL

## 2025-07-30 DIAGNOSIS — R29.898 RIGHT LEG WEAKNESS: Primary | ICD-10-CM

## 2025-07-30 DIAGNOSIS — R29.6 FALLS FREQUENTLY: ICD-10-CM

## 2025-07-30 DIAGNOSIS — R26.9 GAIT DISTURBANCE: ICD-10-CM

## 2025-07-30 PROCEDURE — 97530 THERAPEUTIC ACTIVITIES: CPT | Performed by: PHYSICAL THERAPIST

## 2025-07-30 PROCEDURE — 97162 PT EVAL MOD COMPLEX 30 MIN: CPT | Performed by: PHYSICAL THERAPIST

## 2025-07-30 NOTE — PROGRESS NOTES
Physical Therapy Initial Evaluation and Plan of Care    [facility]  [address]  [place] [address]    Patient: Vicky Ortiz   : 1955  Diagnosis/ICD-10 Code:  Right leg weakness [R29.898]  Referring practitioner: SELENA Stoner  Date of Initial Visit: 2025  Today's Date: 2025  Patient seen for 1 sessions           Subjective Questionnaire: LEFS:       Subjective Evaluation    History of Present Illness  Mechanism of injury: Right leg weakness, frequent falls. Patient noticed difficulty with balance over 1 year ago when walking on a floating dock, rough terrain, and on her boat. She started falling by tripping on her rug last year. She notices her R foot fails to move as needed when she falls, often dragging. She has taken 2 significant falls in the past 8 months. She tripped on hard surface 8 months ago without injury, but 2.5 months ago she tried to walk faster on a hard surface and fell forward onto her face. She has numbness/tingling R>L feet. She has diabetes which is fairly well controlled.        Pain  Progression: worsening    Social Support  Patient lives at: caregiver for  with advanced frontotemporal dimentia.    Patient Goals  Patient goals for therapy: improved balance, increased strength and independence with ADLs/IADLs       Patient Active Problem List    Diagnosis Date Noted    Falls frequently 07/15/2025    Right leg weakness 07/15/2025    Pneumonia due to COVID-19 virus 2025    Exposure to blood 2025    Screen for colon cancer 10/09/2024    Left upper lobe 11 mm noncalcified nodule () 10/08/2024     Note Last Updated: 10/8/2024     Just repeated CT scan of lungs. Results are not available at this time.      Mucopurulent chronic bronchitis 10/08/2024     Note Last Updated: 10/8/2024     Stage III COPD.  Has seen pulmonologist today.  Continue Trelegy 200-62.5-25 daily, albuterol as needed.      Routine  medical exam 10/08/2024     Note Last Updated: 10/8/2024     She declines immunizations-will get any at [facility].  Labs reviewed with patient from July.        Carpal tunnel syndrome of right wrist 08/05/2024    Personal history of smoking 07/30/2024    Thyroid nodule 07/30/2024     Note Last Updated: 7/30/2024     US thyroid: Bilateral thyroid nodularity none meet TI-RADS criteria for FNA.  Nodules were of low suspicion  TSH August 2023 within normal limits.      Chronic systolic (congestive) heart failure 07/29/2024     Note Last Updated: 10/8/2024     Pacemaker/defibrillator-carvedilol 12.5 BID.  Uses torsemide 20mg daily, but can increase if needed, per cardiology.      Essential hypertension 07/29/2024     Note Last Updated: 10/8/2024     Takes lisinopril 10mg daily.       Trigger middle finger of right hand 07/29/2024    Screening for osteoporosis 07/29/2024    Mild intermittent asthma without complication 07/29/2024    Diabetes mellitus 09/15/2016     Note Last Updated: 1/9/2025     Diagnosed 2004.       Hyperlipidemia 09/15/2016     Note Last Updated: 10/8/2024     Continue pravastatin 40mg nightly.       Past Medical History:   Diagnosis Date    Allergic 1978    Arthritis 2015    Asthma 1979    Candida glabrata infection 07/29/2024    Carpal tunnel syndrome of right wrist 08/05/2024    CHF (congestive heart failure) 2013    COPD (chronic obstructive pulmonary disease) 2014    Coronary artery disease 2013    Diabetes mellitus 2015    Diabetic retinopathy     Hyperlipidemia 1997    Hypertension 1997    Knee swelling     Left bundle branch block (LBBB)     followed by Dr. Gonzalez    Lung nodule     Neuromuscular disorder 2004    Obesity 1978    Osteoarthritis 09/15/2016    Right knee Per EPIC      Pneumonia 2016/2017    Hospitalized    Tachycardia 09/15/2016    Per EPIC       Past Surgical History:   Procedure Laterality Date    CARDIAC CATHETERIZATION  2018    CARDIAC SURGERY  2019    COLONOSCOPY  2013     COLONOSCOPY N/A 02/06/2025    Procedure: COLONOSCOPY;  Surgeon: Torri Burrell MD;  Location: [facility] ENDOSCOPY;  Service: Gastroenterology;  Laterality: N/A;    FRACTURE SURGERY  2018    INSERT / REPLACE / REMOVE PACEMAKER  6/2019    PACEMAKER IMPLANTATION  2019    PILONIDAL CYSTECTOMY      TONSILLECTOMY  1961    [facility]       Objective          Postural Observations    Additional Postural Observation Details  Hyperlordotic, B hip flexion    Neurological Testing     Sensation     Lumbar   Left   Intact: light touch    Comments   Right light touch: diminished L4, L5    Reflexes   Left   Patellar (L4): normal (2+)  Achilles (S1): normal (2+)    Right   Patellar (L4): normal (2+)  Achilles (S1): trace (1+)    Additional Neurological Details  + slump bilaterally    Active Range of Motion   Left Ankle/Foot   Dorsiflexion (ke): WFL    Additional Active Range of Motion Details  R ankle DF unable to achieve neutral    Strength/Myotome Testing     Left Hip   Planes of Motion   Flexion: 5    Right Hip   Planes of Motion   Flexion: 4+    Left Knee   Flexion: 5  Extension: 5    Right Knee   Flexion: 4+  Extension: 4- (weak and painful)    Left Ankle/Foot   Dorsiflexion: 4+  Plantar flexion: 5  Great toe extension: 4+    Right Ankle/Foot   Dorsiflexion: 4-  Plantar flexion: 4  Great toe extension: 4+    Ambulation     Comments   Mild ataxia, increased R stance time, arms remain outstretched, seeks external support when available, toes out bilaterally    Functional Assessment     Single Leg Stance   Left: 3 seconds  Right: 0 seconds    Comments  5x sit to stand: back pain during so DNT          Assessment & Plan       Assessment  Impairments: abnormal coordination, abnormal gait, abnormal muscle firing, abnormal or restricted ROM, activity intolerance, impaired balance, impaired physical strength, lacks appropriate home exercise program and safety issue   Functional limitations:  carrying objects, lifting, walking, moving in bed and standing   Assessment details: Patient is a 69 year old female who presents with recurrent falls and impaired balance.  She reports her right foot seems to drag or lagging behind, especially if she is not mindful of how she is moving it.  Gait is mildly ataxic, with right foot flat at initial contact.  She demonstrates decreased sensation of right L4 and L5 dermatomes. Right S1 DTR is diminished.  Significant weakness noted with right L3, L4, L5, and S1 myotomes.  Slump test is positive bilaterally.  Signs and symptoms are consistent with peripheral neuropathy of unknown etiology, possibly lumbar radiculopathy versus diabetic peripheral neuropathy.  Her PCP was contacted regarding findings and she is to schedule follow-up for additional assessment.  Patient will receive 2 additional visits to establish HEP for strength and balance exercises as she is unable to participate in a full PT plan of care due to caregiving duties for her .  Barriers to therapy: caregiving responsibilities and limited availability, comorbidities  Prognosis: fair    Goals  Plan Goals: Short Term Goals 2 weeks  1. Patient to be compliant with initial HEP    Long Term Goals 4 weeks  1. Patient to be independent with HEP.  2. 5x sit to  < 20 sec without UE assist.  3. Patient to improve LEFS score to at least 40/80.       Plan  Therapy options: will be seen for skilled therapy services  Planned modality interventions: cryotherapy and thermotherapy (hydrocollator packs)  Planned therapy interventions: ADL retraining, balance/weight-bearing training, functional ROM exercises, home exercise program, transfer training, therapeutic activities, strengthening, neuromuscular re-education, abdominal trunk stabilization, motor coordination training and stretching  Frequency: 1x week  Duration in weeks: 4  Treatment plan discussed with: patient        Timed:  Manual Therapy:    0     mins   93076;  Therapeutic Exercise:    0     mins  87173;     Neuromuscular Bren:    0    mins  58332;    Therapeutic Activity:     25     mins  67396;     Gait Trainin     mins  41740;     Ultrasound:     0     mins  92058;    Self Care Trainin     mins  15066;    Untimed:  Electrical Stimulation:    0     mins  75804 ( );  Mechanical Traction:    0     mins  32741;   Dry Needlin     mins    Re-evaluation                0      mins  83429;  Group Therapy              0      mins  29582;    Timed Treatment:   25   mins   Total Treatment:     55   mins    PT SIGNATURE: Jonn Mena PT   License Number: [id number]  DATE TREATMENT INITIATED: 2025    Initial Certification  Certification Period: 10/28/2025  I certify that the therapy services are furnished while this patient is under my care.  The services outlined above are required by this patient, and will be reviewed every 90 days.     PHYSICIAN: Clemencia Robins PA-C      DATE:     Please sign and return via fax to [phone].. Thank you, [facility].

## 2025-08-01 ENCOUNTER — TELEPHONE (OUTPATIENT)
Dept: INTERNAL MEDICINE | Facility: CLINIC | Age: 70
End: 2025-08-01
Payer: COMMERCIAL

## 2025-08-05 DIAGNOSIS — G89.29 CHRONIC RIGHT-SIDED LOW BACK PAIN WITH RIGHT-SIDED SCIATICA: Primary | ICD-10-CM

## 2025-08-05 DIAGNOSIS — M54.41 CHRONIC RIGHT-SIDED LOW BACK PAIN WITH RIGHT-SIDED SCIATICA: Primary | ICD-10-CM

## 2025-08-21 ENCOUNTER — HOSPITAL ENCOUNTER (OUTPATIENT)
Dept: GENERAL RADIOLOGY | Facility: HOSPITAL | Age: 70
Discharge: HOME | End: 2025-08-21
Admitting: PHYSICIAN ASSISTANT
Payer: COMMERCIAL

## 2025-08-21 DIAGNOSIS — M54.41 CHRONIC RIGHT-SIDED LOW BACK PAIN WITH RIGHT-SIDED SCIATICA: ICD-10-CM

## 2025-08-21 DIAGNOSIS — G89.29 CHRONIC RIGHT-SIDED LOW BACK PAIN WITH RIGHT-SIDED SCIATICA: ICD-10-CM

## 2025-08-21 PROCEDURE — 72100 X-RAY EXAM L-S SPINE 2/3 VWS: CPT

## 2025-08-26 ENCOUNTER — OFFICE VISIT (OUTPATIENT)
Dept: INTERNAL MEDICINE | Facility: CLINIC | Age: 70
End: 2025-08-26
Payer: COMMERCIAL

## 2025-08-26 VITALS
BODY MASS INDEX: 38.24 KG/M2 | DIASTOLIC BLOOD PRESSURE: 58 MMHG | WEIGHT: 224 LBS | TEMPERATURE: 96.8 F | HEIGHT: 64 IN | SYSTOLIC BLOOD PRESSURE: 88 MMHG | HEART RATE: 80 BPM

## 2025-08-26 DIAGNOSIS — I10 ESSENTIAL HYPERTENSION: ICD-10-CM

## 2025-08-26 DIAGNOSIS — B02.9 HERPES ZOSTER WITHOUT COMPLICATION: Primary | ICD-10-CM

## 2025-08-26 DIAGNOSIS — I50.22 CHRONIC SYSTOLIC (CONGESTIVE) HEART FAILURE: ICD-10-CM

## 2025-08-26 PROCEDURE — 99214 OFFICE O/P EST MOD 30 MIN: CPT

## 2025-08-26 RX ORDER — VALACYCLOVIR HYDROCHLORIDE 1 G/1
1000 TABLET, FILM COATED ORAL 3 TIMES DAILY
Qty: 21 TABLET | Refills: 0 | Status: SHIPPED | OUTPATIENT
Start: 2025-08-26 | End: 2025-09-02

## 2025-08-26 RX ORDER — VALACYCLOVIR HYDROCHLORIDE 1 G/1
1000 TABLET, FILM COATED ORAL 2 TIMES DAILY PRN
COMMUNITY
Start: 2025-07-28

## 2025-08-26 RX ORDER — CARVEDILOL 6.25 MG/1
6.25 TABLET ORAL 2 TIMES DAILY WITH MEALS
Qty: 180 TABLET | Refills: 1 | Status: SHIPPED | OUTPATIENT
Start: 2025-08-26

## 2025-08-29 DIAGNOSIS — R93.89 ABNORMAL CHEST CT: Primary | ICD-10-CM

## 2025-08-30 ENCOUNTER — RESULTS FOLLOW-UP (OUTPATIENT)
Dept: INTERNAL MEDICINE | Facility: CLINIC | Age: 70
End: 2025-08-30
Payer: COMMERCIAL

## 2025-08-30 DIAGNOSIS — M54.41 CHRONIC RIGHT-SIDED LOW BACK PAIN WITH RIGHT-SIDED SCIATICA: Primary | ICD-10-CM

## 2025-08-30 DIAGNOSIS — G89.29 CHRONIC RIGHT-SIDED LOW BACK PAIN WITH RIGHT-SIDED SCIATICA: Primary | ICD-10-CM

## (undated) DEVICE — CONTN GRAD MEAS TRIANG 32OZ BLK

## (undated) DEVICE — SOLIDIFIER LIQ PREMISORB 1500CC

## (undated) DEVICE — FIRST STEP BEDSIDE ADD WATER KIT - RESEALABLE STAND-UP POUCH, ENDOSCOPIC CLEANING PAD - 1 POUCH: Brand: FIRST STEP BEDSIDE ADD WATER KIT - RESEALABLE STAND-UP POUCH, ENDOSCOPIC CLEANIN

## (undated) DEVICE — SAFELINER SUCTION CANISTER 1000CC: Brand: DEROYAL

## (undated) DEVICE — AIR/WATER CLEANING VALVES: Brand: AIR/WATER CLEANING VALVES

## (undated) DEVICE — SOL IRR H2O BO 1000ML STRL

## (undated) DEVICE — INTRO ACCSR BLNT TP

## (undated) DEVICE — LUBE JELLY FOIL PACK 1.4 OZ: Brand: MEDLINE INDUSTRIES, INC.

## (undated) DEVICE — TUBING, SUCTION, 1/4" X 10', STRAIGHT: Brand: MEDLINE

## (undated) DEVICE — SYR LUERLOK 50ML

## (undated) DEVICE — HYBRID CO2 TUBING/CAP SET FOR OLYMPUS® SCOPES & CO2 SOURCE: Brand: ERBE

## (undated) DEVICE — KT ORCA ORCAPOD DISP STRL

## (undated) DEVICE — ENDOGATOR HYBRID TUBING KIT FOR USE WITH ENDOGATOR IRRIGATION PUMP, OLYMPUS PUMP, GI4000 ESU, AND TORRENT IRRIGATION PUMP.: Brand: ENDOGATOR KIT